# Patient Record
Sex: MALE | Race: WHITE | NOT HISPANIC OR LATINO | URBAN - METROPOLITAN AREA
[De-identification: names, ages, dates, MRNs, and addresses within clinical notes are randomized per-mention and may not be internally consistent; named-entity substitution may affect disease eponyms.]

---

## 2017-01-24 ENCOUNTER — TELEPHONE (OUTPATIENT)
Dept: NEUROLOGY | Facility: CLINIC | Age: 22
End: 2017-01-24

## 2017-01-24 NOTE — TELEPHONE ENCOUNTER
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

## 2017-01-26 NOTE — TELEPHONE ENCOUNTER
Received a VM from [facility] regarding patient's med. Please expedite this and see Griselda's note from 6/30/2014. Thanks.

## 2017-01-26 NOTE — TELEPHONE ENCOUNTER
Pharmacy calling. Patient will be out of medication as of tomorrow. Pharmacy would like a call back on what the status of this PA is.

## 2017-01-27 NOTE — TELEPHONE ENCOUNTER
[facility] Prior Authorization Team   Phone: [phone]  Fax: [phone]    PA Initiation    Medication: LAMICTAL 200 MG tablet  Insurance Company: [employer] - Phone [phone] Fax [phone]  [id number] [address]  Filling Pharmacy Phone: [phone]  Filling Pharmacy Fax: [phone]  Start Date: 1/27/2017    Attached Dr notes

## 2017-01-30 NOTE — TELEPHONE ENCOUNTER
Prior Authorization Approval    Authorization Effective Date: 1/28/2017  Authorization Expiration Date: 1/28/2018  Medication: LAMICTAL 200 MG tablet- approved  Approved Dose/Quantity:   Reference #: [id number]   Insurance Company: [facility] - Phone [phone] Fax [phone]  Expected CoPay: $0.00     CoPay Card Available:      Foundation Assistance Needed:    Which Pharmacy is filling the prescription (Not needed for infusion/clinic administered): [id number] [place] [facility] [address] [place] [address]  Pharmacy Notified: Yes  Patient Notified: Yes, left message

## 2017-03-15 DIAGNOSIS — G40.319 GENERALIZED CONVULSIVE EPILEPSY WITH INTRACTABLE EPILEPSY (H): ICD-10-CM

## 2017-03-15 RX ORDER — PERAMPANEL 4 MG/1
TABLET ORAL
Qty: 90 TABLET | Refills: 1 | Status: SHIPPED | OUTPATIENT
Start: 2017-03-15 | End: 2017-06-30

## 2017-03-15 NOTE — TELEPHONE ENCOUNTER
----- Message from Josseline Virgen CMA sent at 3/15/2017 12:39 PM CDT -----  Regarding: Refill  Drug Name: Perampanel (FYCOMPA)       Dose: 4 mg tablet    Brand  SIG: TAKE 1 TABLET BY MOUTH DAILY AT BEDTIME                                  Days Supply Needed: 90    Pharmacy: [place] [id number] [place] [facility] [place], [place] - [address] [facility]      Date of Last Appointment: 09/08/2016  Next RTC Date: 06/16/2017  Has a script already been sent recently: No    Additonal Notes: Needs by the weekend

## 2017-05-22 DIAGNOSIS — G40.319 GENERALIZED CONVULSIVE EPILEPSY WITH INTRACTABLE EPILEPSY (H): ICD-10-CM

## 2017-05-22 RX ORDER — LAMOTRIGINE 200 MG/1
TABLET ORAL
Qty: 360 TABLET | Refills: 2 | OUTPATIENT
Start: 2017-05-22

## 2017-06-30 ENCOUNTER — OFFICE VISIT (OUTPATIENT)
Dept: NEUROLOGY | Facility: CLINIC | Age: 22
End: 2017-06-30

## 2017-06-30 VITALS
SYSTOLIC BLOOD PRESSURE: 122 MMHG | HEART RATE: 81 BPM | BODY MASS INDEX: 27.05 KG/M2 | HEIGHT: 69 IN | DIASTOLIC BLOOD PRESSURE: 62 MMHG | WEIGHT: 182.6 LBS

## 2017-06-30 DIAGNOSIS — G40.319 GENERALIZED CONVULSIVE EPILEPSY WITH INTRACTABLE EPILEPSY (H): Primary | ICD-10-CM

## 2017-06-30 RX ORDER — LAMOTRIGINE 200 MG/1
TABLET ORAL
Qty: 360 TABLET | Refills: 3 | Status: SHIPPED | OUTPATIENT
Start: 2017-06-30 | End: 2018-07-16

## 2017-06-30 RX ORDER — ZONISAMIDE 100 MG/1
CAPSULE ORAL
Qty: 360 CAPSULE | Refills: 1 | Status: SHIPPED | OUTPATIENT
Start: 2017-06-30 | End: 2018-08-02

## 2017-06-30 NOTE — PROGRESS NOTES
"Interval HX: No daytime sz since July 2015. This is coincident with starting peramampanel. Mom  notes some \"jerking\" at night but it does not wake Zach    Prior to Admission medications    Medication Sig Start Date End Date Taking? Authorizing Provider   Perampanel (FYCOMPA) 4 MG tablet Take 1 tablet (4 mg) by mouth At Bedtime 9/8/16  Yes Panda Holden MD   zonisamide (ZONEGRAN) 100 MG capsule TAKE TWO CAPSULES BY MOUTH THREE TIMES DAILY 9/8/16  Yes Panda Holden MD   LAMICTAL 200 MG tablet 1 in Am 1 around noon  2 at night.YULY only. Patient taking differently, taking 200mg TID. 9/8/16  Yes Panda Holden MD       HISTORY REVIEWED 6/30/17: Zach   has symptomatic generalized brittle epilepsy.  Age of onset was 6 years.  He has allergies to Dilantin and Depakote and he has failed generic Lamictal.  Neuropsychological testing 06/30/2010 indicated full scale IQ of 66, but this may have been somewhat influenced by his lack of cooperation.  Nevertheless, the impression was that the test results revealed considerable evidence of generalized cerebral dysfunction, moderate in overall degree and reflected in widespread intellectual limitations and cognitive deficits and scattered diffuse features.He also has ADHD.      His last EEG was in 2/22/2016 and was interpreted as moderate disturbance of background activity with frequent electrographic interictal and ictal activity which was generalized. Not significantly changed from previous in 2012   He had a presurgical evaluation at [facility] in 2007 and also had a [facility] evaluation in 2004.  MRI scan at [facility] 04/2011 was essentially normal, with no evidence of mesial temporal sclerosis.    He has been tried on a number of medications but mother feels that the current medications are the best in terms of seizure control, though they are certainly not fully controlling seizures.     Another issue is financial.  Because he failed the generic Lamictal, their drug costs " "are quite high and he needs annual preauthorization.  This creates a great deal of headache and red tape for our clinic, [employer], and pharmacists.  Unfortunately the system is such that even though his situation is chronic and not changing, an annual review is done.    ROS: has small lesions on arm and back. Bone in left wrist \"no blood supply\" Otherwise negative for remainder of 14 point ros   EXAMINATION: Now tall, thin. He was more verbal and interacted well and cooperated with the examination than last time.  Speech was fluent.  Extraocular movements were full without nystagmus.  Pupils reacted briskly to light.  Facial movements were symmetrical.  Swallowing was normal.  Hearing was intact.  Visual fields were full to confrontation.  Finger-nose-finger was intact.  Tandem gait was normal. Muscle tone and strength and coordination in the upper and lower extremities were normal.      ASSESSMENT:  Daytime Sz now under control !!! Begin taper of ZNS as mom feels this has affected behavior to a small degree   Plan:  1) ZNS drop to 100 at noon for 2 weeks the D/C noon dose. Continue 200 AM and 200 HS   2) Continue perampanel at 4 mg hs   3) RTC 3mo   4)ASD levels         "

## 2017-06-30 NOTE — MR AVS SNAPSHOT
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 Continue 2 Am and 2 PM and all others the same. If any seizures increase Fycompa to 2 (8 mg) at night    Take 2 zonisamide at 6 am    Take 1 zonisamide at 4 pm for 2 weeks, then take none.     Take 2 zonisamide at 8 pm.           Follow-ups after your visit        Follow-up notes from your care team     Return in about 3 months (around 9/30/2017).      Your next 10 appointments already scheduled     Oct 03, 2017  4:00 PM CDT   Return Visit with MD SEAN [facility] ([facility])    [address], [address]  [facility] [address] [phone]              Who to contact     Please call your clinic at [phone] to:    Ask questions about your health    Make or cancel appointments    Discuss your medicines    Learn about your test results    Speak to your doctor   If you have compliments or concerns about an experience at your clinic, or if you wish to file a complaint, please contact [facility] Patient Relations at [phone] or email us at [email or web address]         Additional Information About Your Visit        MyChart Information     "[employer] is an electronic gateway that provides easy, online access to your medical records. With "[employer] you can request a clinic appointment, read your test results, renew a prescription or communicate with your care team.     To sign up for "[employer] visit the website at [email or web address]   You will be asked to enter the access code listed below, as  "well as some personal information. Please follow the directions to create your username and password.     Your access code is: [email or web address]  Expires: 2017  2:22 PM     Your access code will  in 90 days. If you need help or a new code, please contact your [facility] or call [phone] for assistance.        Care EveryWhere ID     This is your Care EveryWhere ID. This could be used by other organizations to access your [place] medical records  [id number]        Your Vitals Were     Pulse Height BMI (Body Mass Index)             81 5' 9.1\" (175.5 cm) 26.89 kg/m2          Blood Pressure from Last 3 Encounters:   17 122/62   16 120/70   16 121/62    Weight from Last 3 Encounters:   17 182 lb 9.6 oz (82.8 kg)   16 184 lb 12.8 oz (83.8 kg)   16 186 lb 9.6 oz (84.6 kg) (86 %)*     * Growth percentiles are based on [facility] 2-20 Years data.              We Performed the Following     Lamotrigine Level     Vitamin D Deficiency (Calcifediol)     Zonisamide Level Quantitative          Today's Medication Changes          These changes are accurate as of: 17  2:41 PM.  If you have any questions, ask your nurse or doctor.               These medicines have changed or have updated prescriptions.        Dose/Directions    perampanel 4 MG tablet   Commonly known as:  FYCOMPA   This may have changed:  See the new instructions.   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  [address] [place] [address]     Phone:  [phone]     LAMICTAL 200 MG tablet    zonisamide 100 MG capsule         Some of these will need a paper prescription and others can be bought over the counter.  Ask your nurse if you have questions.     Bring a paper prescription for each of these medications     perampanel 4 MG tablet                Primary Care Provider Office Phone # Fax #    Ade Alyse Flowers [phone]       [facility] [address]  [facility] [address]        Equal Access to Services     [facility] Angeles[facility]AND[facility] : Hadii nery calderón hadasho Soleelee, waaxda luqadaha, qaybta kaalmada adeegyada, nic guerra . So [facility] [phone].    ATENCIÓN: Si habla español, tiene a lopez disposición servicios gratuitos de asistencia lingüística. Llame al [phone].    We comply with applicable federal civil rights laws and [place] laws. We do not discriminate on the basis of race, color, national origin, age, disability sex, sexual orientation or gender identity.            Thank you!     Thank you for choosing [facility]  for your care. Our goal is always to provide you with excellent care. Hearing back from our patients is one way we can continue to improve our services. Please take a few minutes to complete the written survey that you may receive in the mail after your visit with us. Thank you!             Your Updated Medication List - Protect others around you: Learn how to safely use, store and throw away your medicines at www.disposemymeds.org.          This list is accurate as of: 6/30/17  2:41 PM.  Always use your most recent med list.                   Brand Name Dispense Instructions for use Diagnosis    LAMICTAL 200 MG tablet   Generic drug:  lamoTRIgine     360 tablet    Take 1 tablet po am 1 tablet around noon  2 at night. YULY only.     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

## 2017-06-30 NOTE — PATIENT INSTRUCTIONS
Take one Zonisamide at noon for 2 weeks then take none . Continue 2 Am and 2 PM and all others the same. If any seizures increase Fycompa to 2 (8 mg) at night    Take 2 zonisamide at 6 am    Take 1 zonisamide at 4 pm for 2 weeks, then take none.     Take 2 zonisamide at 8 pm.

## 2017-07-01 LAB — DEPRECATED CALCIDIOL+CALCIFEROL SERPL-MC: 27 UG/L (ref 20–75)

## 2017-07-02 LAB
LAMOTRIGINE SERPL-MCNC: 14.4 UG/ML
ZONISAMIDE SERPL-MCNC: 26 UG/ML

## 2017-08-25 ENCOUNTER — TELEPHONE (OUTPATIENT)
Dept: NEUROLOGY | Facility: CLINIC | Age: 22
End: 2017-08-25

## 2017-08-25 NOTE — TELEPHONE ENCOUNTER
[facility] Prior Authorization Team   Phone: [phone]  Fax: [phone]      PA Initiation    Medication: Zonegran 100 mg  Insurance Company: [employer] - Phone [phone] Fax [phone]  [id number] [place] - [address]  Filling Pharmacy Phone: [phone]  Filling Pharmacy Fax:    Start Date: 8/25/2017    MANUALLY FAXED IN [place] [phone]

## 2017-08-25 NOTE — TELEPHONE ENCOUNTER
Prior Authorization Retail Medication Request  Medication/Dose: Lamictal 200 mg  Diagnosis and ICD code:   Generalized convulsive epilepsy with intractable epilepsy (H)  - Primary G40.319      New/Renewal/Insurance Change PA: Insurance Change  Previously Tried and Failed Therapies: See 1/24/2017 PA     Insurance ID (if provided):   Insurance Phone (if provided):      Any additional info from fax request:      If you received a fax notification from an outside Pharmacy:  [facility]  Pharmacy #:[phone]  Pharmacy Fax:[phone]

## 2017-08-25 NOTE — TELEPHONE ENCOUNTER
According to mom patient was changed from Medicare choice to [facility]! On medica choice, patient 's LAMICTAL- LAMOTRIGINE YULY approved until 1/2018!! According to mom in May patient was changed and only now they started with an issue.!   co-payment for generic  zonisamide is 58.-/month and patient is going to be  tapered off.    Called [facility] and spoke with May and I told her as well as Dr. Holden, patient needs those medications it is otherwise life and death. Patient could go into status epilepticus and 20% death rate. Asked minimum for over the weekend approval!    Spent 1 hour to take care of this issue!    Pharmacy will give supply over the week-end    PA request will be put into the system by !!    We can help out with Fycompa 4 mg.

## 2017-08-25 NOTE — TELEPHONE ENCOUNTER
[facility] Prior Authorization Team   Phone: [phone]  Fax: [phone]      PA Initiation    Medication: Fycompa 4 mg  Insurance Company: [facility] - Phone [phone] Fax [phone]  [facility] [id number] [place] [facility] [place] [place] - [address]  Filling Pharmacy Phone: [phone]  Filling Pharmacy Fax:    Start Date: 8/25/2017    MANUALLY FAXED IN [place] [phone]

## 2017-08-25 NOTE — TELEPHONE ENCOUNTER
Prior Authorization Retail Medication Request  Medication/Dose: Zonegran 100 mg  Diagnosis and ICD code:   Generalized convulsive epilepsy with intractable epilepsy (H)  - Primary G40.319      New/Renewal/Insurance Change PA: Insurance Change  Previously Tried and Failed Therapies: See Chart     Insurance ID (if provided):   Insurance Phone (if provided):      Any additional info from fax request:      If you received a fax notification from an outside Pharmacy:  [facility]  Pharmacy #:[phone]  Pharmacy Fax:[phone]

## 2017-08-25 NOTE — TELEPHONE ENCOUNTER
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

## 2017-08-25 NOTE — TELEPHONE ENCOUNTER
[facility] Prior Authorization Team   Phone: [phone]  Fax: [phone]      PA Initiation    Medication: Lamictal 200 mg  Insurance Company: [employer] - Phone [phone] Fax [phone]  [id number] [place] [place] - [address]  Filling Pharmacy Phone: [phone]  Filling Pharmacy Fax:    Start Date: 8/25/2017    MANUALLY FAXED IN [place] [phone]

## 2017-08-30 NOTE — TELEPHONE ENCOUNTER
Prior Authorization was not needed      Medication: Lamictal 200 mg- PA WAS NOT NEEDED  Approved Dose/Quantity:   Reference #:     Insurance Company: [employer] - Phone [phone] Fax [phone]  Expected CoPay:     $3.00  CoPay Card Available:      Foundation Assistance Needed:    Which Pharmacy is filling the prescription (Not needed for infusion/clinic administered): [facility] [id number] [place] [address]  Pharmacy Notified: Yes  Patient Notified: Yes      PA WAS NOT NEEDED. [employer] CLAIM WAS BOUNCING OFF OLD INS (BCBS). I HAD CALLED MOM TO CALL INS AND HAVE THAT REMOVED. PHARMACY WAS ABLE TO GET A PAID CLAIM OF $3.00. PATIENT IS INFORMED OF THIS BUT HAS NOT YET PICKED UP MED.

## 2017-08-30 NOTE — TELEPHONE ENCOUNTER
Prior Authorization was not needed      Medication: Fycompa 4 mg- PA NOT NEEDED  Approved Dose/Quantity:   Reference #:     Insurance Company: [employer] - Phone [phone] Fax [phone]  Expected CoPay: $3.00     CoPay Card Available:      Foundation Assistance Needed:    Which Pharmacy is filling the prescription (Not needed for infusion/clinic administered): [facility] [id number] [place] [address]  Pharmacy Notified: Yes  Patient Notified: Yes      PA WAS NOT NEEDED. [employer] CLAIM WAS BOUNCING OFF OLD INS (BCBS). I HAD CALLED MOM TO CALL INS AND HAVE THAT REMOVED. PHARMACY WAS ABLE TO GET A PAID CLAIM OF $3.00. PATIENT IS INFORMED OF THIS BUT HAS NOT YET PICKED UP MED.

## 2017-08-30 NOTE — TELEPHONE ENCOUNTER
Prior Authorization  Was not needed    Medication: Zonegran 100 mg- PA WAS NOT NEEDED  Approved Dose/Quantity:   Reference #:     Insurance Company: [employer] - Phone [phone] Fax [phone]  Expected CoPay: $1.00     CoPay Card Available:      Foundation Assistance Needed:    Which Pharmacy is filling the prescription (Not needed for infusion/clinic administered): [facility] [id number] [place] [address]  Pharmacy Notified: Yes  Patient Notified: Yes    PA WAS NOT NEEDED. [employer] CLAIM WAS BOUNCING OFF OLD INS (BCBS). I HAD CALLED MOM TO CALL INS AND HAVE THAT REMOVED. PHARMACY WAS ABLE TO GET A PAID CLAIM OF $1.00. PATIENT IS INFORMED OF THIS BUT HAS NOT YET PICKED UP MED.

## 2017-10-03 ENCOUNTER — OFFICE VISIT (OUTPATIENT)
Dept: NEUROLOGY | Facility: CLINIC | Age: 22
End: 2017-10-03

## 2017-10-03 VITALS
DIASTOLIC BLOOD PRESSURE: 77 MMHG | BODY MASS INDEX: 24.28 KG/M2 | SYSTOLIC BLOOD PRESSURE: 134 MMHG | HEART RATE: 78 BPM | HEIGHT: 73 IN | WEIGHT: 183.2 LBS

## 2017-10-03 DIAGNOSIS — G40.319 GENERALIZED CONVULSIVE EPILEPSY WITH INTRACTABLE EPILEPSY (H): Primary | ICD-10-CM

## 2017-10-03 NOTE — MR AVS SNAPSHOT
After Visit Summary   10/3/2017    Zach Gurrola    MRN: [id number]           Patient Information     Date Of Birth          1995        Visit Information        Provider Department      10/3/2017 4:00 PM [facility]         Follow-ups after your visit        Your next 10 appointments already scheduled     Oct 04, 2017 12:30 PM CDT   New Patient Visit with Lorenzo Brink, PhD [facility] ([facility])    [address], [address]  [facility] [id number] [phone]            2018  2:30 PM CST   Return [facility]    [address], [address]  [facility] [address] [phone]              Who to contact     Please call your clinic at [phone] to:    Ask questions about your health    Make or cancel appointments    Discuss your medicines    Learn about your test results    Speak to your doctor   If you have compliments or concerns about an experience at your clinic, or if you wish to file a complaint, please contact [facility] Patient Relations at [phone] or email us at [email or web address]         Additional Information About Your Visit        MyChart Information     [employer] is an electronic gateway that provides easy, online access to your medical records. With [employer], you can request a clinic appointment, read your test results, renew a prescription or communicate with your care team.     To sign up for [employer] visit the website at [email or web address]   You will be asked to enter the access code listed below, as well as some personal information. Please follow the directions to create your username and password.     Your access code is: [email or web address]  Expires: 2018  4:26 PM     Your access code will  in 90 days. If you need help or a new code, please contact your [facility]  "[place] [facility] or call [phone] for assistance.        Care EveryWhere ID     This is your Care EveryWhere ID. This could be used by other organizations to access your [place] medical records  [id number]        Your Vitals Were     Pulse Height BMI (Body Mass Index)             78 6' 1\" (185.4 cm) 24.17 kg/m2          Blood Pressure from Last 3 Encounters:   10/03/17 134/77   06/30/17 122/62   09/08/16 120/70    Weight from Last 3 Encounters:   10/03/17 183 lb 3.2 oz (83.1 kg)   06/30/17 182 lb 9.6 oz (82.8 kg)   09/08/16 184 lb 12.8 oz (83.8 kg)              Today, you had the following     No orders found for display       Primary Care Provider Office Phone # Fax #    Ade Alyse Flowers [phone]       [facility] [address]  [facility] [address]        Equal Access to Services     [facility]ANDI : Hadii aad ku hadasho Soomaali, waaxda luqadaha, qaybta kaalmada adeegyada, waxay idiin hayshaynan mukesh guerra . So [facility] [phone].    ATENCIÓN: Si habla español, tiene a lopez disposición servicios gratuitos de asistencia lingüística. LlMer[facility] [phone].    We comply with applicable federal civil rights laws and [place] laws. We do not discriminate on the basis of race, color, national origin, age, disability, sex, sexual orientation, or gender identity.            Thank you!     Thank you for choosing [facility]  for your care. Our goal is always to provide you with excellent care. Hearing back from our patients is one way we can continue to improve our services. Please take a few minutes to complete the written survey that you may receive in the mail after your visit with us. Thank you!             Your Updated Medication List - Protect others around you: Learn how to safely use, store and throw away your medicines at www.disposemymeds.org.          This list is accurate as of: 10/3/17  4:26 PM.  Always use your most recent med list. "                   Brand Name Dispense Instructions for use Diagnosis    LAMICTAL 200 MG tablet   Generic drug:  lamoTRIgine     360 tablet    Take 1 tablet po am 1 tablet around noon  2 at night. YULY only.    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

## 2017-10-03 NOTE — LETTER
"10/3/2017       RE: Zach Gurrola  : 1995   MRN: [id number]      Dear Colleague,    Thank you for referring your patient, Zach Gurrola, to the [facility] EPILEPSY CARE at [facility]. Please see a copy of my visit note below.    Interval HX: Continues to be sz free. No daytime sz since 2015. This is coincident with starting peramampanel. Mom  notes some \"jerking\" at night but it does not wake Zach    Prior to Admission medications    Medication Sig Start Date End Date Taking? Authorizing Provider   Perampanel (FYCOMPA) 4 MG tablet Take 1 tablet (4 mg) by mouth At Bedtime 16  Yes Panda Holden MD   zonisamide (ZONEGRAN) 100 MG capsule TAKE TWO CAPSULES BY MOUTH THREE TIMES DAILY 16  Yes Panda Holden MD   LAMICTAL 200 MG tablet 1 in Am 1 around noon  2 at night.YULY only. Patient taking differently, taking 200mg TID. 16  Yes Panda Holden MD       HISTORY REVIEWED 9/3/17: Zach   has symptomatic generalized brittle epilepsy.  Age of onset was 6 years.  He has allergies to Dilantin and Depakote and he has failed generic Lamictal.  Neuropsychological testing 2010 indicated full scale IQ of 66, but this may have been somewhat influenced by his lack of cooperation.  Nevertheless, the impression was that the test results revealed considerable evidence of generalized cerebral dysfunction, moderate in overall degree and reflected in widespread intellectual limitations and cognitive deficits and scattered diffuse features.He also has ADHD.      His last EEG was in 2016 and was interpreted as moderate disturbance of background activity with frequent electrographic interictal and ictal activity which was generalized. Not significantly changed from previous in    He had a presurgical evaluation at [place] in  and also had a [facility] evaluation in .  MRI scan at [facility] 2011 was essentially normal, with no evidence of mesial temporal " "sclerosis.    He has been tried on a number of medications but mother feels that the current medications are the best in terms of seizure control, though they are certainly not fully controlling seizures.     Another issue is financial.  Because he failed the generic Lamictal, their drug costs are quite high and he needs annual preauthorization.  This creates a great deal of headache and red tape for our clinic, [employer], and pharmacists.  Unfortunately the system is such that even though his situation is chronic and not changing, an annual review is done.    ROS: has small lesions on arm and back. Bone in left wrist \"no blood supply\" Otherwise negative for remainder of 14 point ros   EXAMINATION: Now tall, thin. He was more verbal and interacted well and cooperated with the examination than last time.  Speech was fluent.  Extraocular movements were full without nystagmus.  Pupils reacted briskly to light.  Facial movements were symmetrical.  Swallowing was normal.  Hearing was intact.  Visual fields were full to confrontation.  Finger-nose-finger was intact.  Tandem gait was normal. Muscle tone and strength and coordination in the upper and lower extremities were normal.      ASSESSMENT:  Daytime Sz now under control !!! Continue taper of ZNS as mom feels this has affected behavior to a small degree. More than 50% of 26 min visit in discussing disability  For low intellectual functioning. He has not learned to drive as he is having difficulty learning., Also no work; \"relaxes\" at home all day. Did not do well in school. I believe he is not capable of living on own. Will do neuropsych testing.   Plan:  1) ZNS drop to 100 at AM 4 weeks  And 200 hs. Drop 100 mg ZNS every month.  2) Continue perampanel at 4 mg hs   3) RTC 3mo   4) Neuropsych testing      Panda Holden MD      "

## 2017-10-03 NOTE — PATIENT INSTRUCTIONS
Take 0ne 100 mg Zonisamide in AM, 2 im PM for 1 month, then take 1 Am and 1 PM for a month, then 1 in PM for a month, then stop. If seizures happen, go back to 2 twice a day and call us.

## 2017-10-03 NOTE — PROGRESS NOTES
"Interval HX: Continues to be sz free. No daytime sz since July 2015. This is coincident with starting peramampanel. Mom  notes some \"jerking\" at night but it does not wake Zach    Prior to Admission medications    Medication Sig Start Date End Date Taking? Authorizing Provider   Perampanel (FYCOMPA) 4 MG tablet Take 1 tablet (4 mg) by mouth At Bedtime 9/8/16  Yes Panda Holden MD   zonisamide (ZONEGRAN) 100 MG capsule TAKE TWO CAPSULES BY MOUTH THREE TIMES DAILY 9/8/16  Yes Panda Holden MD   LAMICTAL 200 MG tablet 1 in Am 1 around noon  2 at night.YULY only. Patient taking differently, taking 200mg TID. 9/8/16  Yes Panda Holden MD       HISTORY REVIEWED 9/3/17: Zach   has symptomatic generalized brittle epilepsy.  Age of onset was 6 years.  He has allergies to Dilantin and Depakote and he has failed generic Lamictal.  Neuropsychological testing 06/30/2010 indicated full scale IQ of 66, but this may have been somewhat influenced by his lack of cooperation.  Nevertheless, the impression was that the test results revealed considerable evidence of generalized cerebral dysfunction, moderate in overall degree and reflected in widespread intellectual limitations and cognitive deficits and scattered diffuse features.He also has ADHD.      His last EEG was in 2/22/2016 and was interpreted as moderate disturbance of background activity with frequent electrographic interictal and ictal activity which was generalized. Not significantly changed from previous in 2012   He had a presurgical evaluation at [place] in 2007 and also had a [facility] evaluation in 2004.  MRI scan at [facility] 04/2011 was essentially normal, with no evidence of mesial temporal sclerosis.    He has been tried on a number of medications but mother feels that the current medications are the best in terms of seizure control, though they are certainly not fully controlling seizures.     Another issue is financial.  Because he failed the generic " "Lamictal, their drug costs are quite high and he needs annual preauthorization.  This creates a great deal of headache and red tape for our clinic, [employer], and pharmacists.  Unfortunately the system is such that even though his situation is chronic and not changing, an annual review is done.    ROS: has small lesions on arm and back. Bone in left wrist \"no blood supply\" Otherwise negative for remainder of 14 point ros   EXAMINATION: Now tall, thin. He was more verbal and interacted well and cooperated with the examination than last time.  Speech was fluent.  Extraocular movements were full without nystagmus.  Pupils reacted briskly to light.  Facial movements were symmetrical.  Swallowing was normal.  Hearing was intact.  Visual fields were full to confrontation.  Finger-nose-finger was intact.  Tandem gait was normal. Muscle tone and strength and coordination in the upper and lower extremities were normal.      ASSESSMENT:  Daytime Sz now under control !!! Continue taper of ZNS as mom feels this has affected behavior to a small degree. More than 50% of 26 min visit in discussing disability  For low intellectual functioning. He has not learned to drive as he is having difficulty learning., Also no work; \"relaxes\" at home all day. Did not do well in school. I believe he is not capable of living on own. Will do neuropsych testing.   Plan:  1) ZNS drop to 100 at AM 4 weeks  And 200 hs. Drop 100 mg ZNS every month.  2) Continue perampanel at 4 mg hs   3) RTC 3mo   4) Neuropsych testing  "

## 2018-01-02 DIAGNOSIS — G40.319 GENERALIZED CONVULSIVE EPILEPSY WITH INTRACTABLE EPILEPSY (H): ICD-10-CM

## 2018-01-02 RX ORDER — PERAMPANEL 4 MG/1
TABLET ORAL
Qty: 90 TABLET | Status: CANCELLED | OUTPATIENT
Start: 2018-01-02

## 2018-01-05 ENCOUNTER — OFFICE VISIT (OUTPATIENT)
Dept: NEUROLOGY | Facility: CLINIC | Age: 23
End: 2018-01-05
Payer: COMMERCIAL

## 2018-01-05 VITALS
SYSTOLIC BLOOD PRESSURE: 124 MMHG | BODY MASS INDEX: 24.7 KG/M2 | DIASTOLIC BLOOD PRESSURE: 62 MMHG | HEART RATE: 79 BPM | HEIGHT: 73 IN | WEIGHT: 186.4 LBS

## 2018-01-05 DIAGNOSIS — G40.319 GENERALIZED CONVULSIVE EPILEPSY WITH INTRACTABLE EPILEPSY (H): ICD-10-CM

## 2018-01-05 RX ORDER — ZONISAMIDE 100 MG/1
CAPSULE ORAL
Qty: 360 CAPSULE | Refills: 1 | OUTPATIENT
Start: 2018-01-05

## 2018-01-05 NOTE — MR AVS SNAPSHOT
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      Who to contact     Please call your clinic at [phone] to:    Ask questions about your health    Make or cancel appointments    Discuss your medicines    Learn about your test results    Speak to your doctor   If you have compliments or concerns about an experience at your clinic, or if you wish to file a complaint, please contact [facility] Patient Relations at [phone] or email us at [email or web address]         Additional Information About Your Visit        MyChart Information     [employer] is an electronic gateway that provides easy, online access to your medical records. With [employer], you can request a clinic appointment, read your test results, renew a prescription or communicate with your care team.     To sign up for [employer] visit the website at [email or web address]   You will be asked to enter the access code listed below, as well as some personal information. Please follow the directions to create your username and password.     Your access code is: [email or web address]  Expires: 2018 12:50 PM     Your access code will  in 90 days. If you need help or a new code, please contact your [facility] Clinic or call [phone] for assistance.        Care EveryWhere ID     This is your Care EveryWhere ID. This could be used by other organizations to access your [facility] medical records  [id number]        Your Vitals Were     Pulse Height BMI (Body Mass Index)  "            79 6' 1\" (185.4 cm) 24.59 kg/m2          Blood Pressure from Last 3 Encounters:   01/05/18 124/62   10/03/17 134/77   06/30/17 122/62    Weight from Last 3 Encounters:   01/05/18 186 lb 6.4 oz (84.6 kg)   10/03/17 183 lb 3.2 oz (83.1 kg)   06/30/17 182 lb 9.6 oz (82.8 kg)              Today, you had the following     No orders found for display         Where to get your medicines      Some of these will need a paper prescription and others can be bought over the counter.  Ask your nurse if you have questions.     Bring a paper prescription for each of these medications     perampanel 4 MG tablet          Primary Care Provider Office Phone # Fax #    Ade Alyse Flowers [phone]       [place] [address]  [facility] [address]        Equal Access to Services     YURI CLARK AH: Hadii nery ku hadasho Soomaali, waaxda luqadaha, qaybta kaalmada adeegyada, nic guerra . So [facility] [phone].    ATENCIÓN: Si rhinala espletty, tiene a lopez disposición servicios gratuitos de asistencia lingüística. Llame al [phone].    We comply with applicable federal civil rights laws and [place] laws. We do not discriminate on the basis of race, color, national origin, age, disability, sex, sexual orientation, or gender identity.            Thank you!     Thank you for choosing [facility] EPILEPSY [facility]  for your care. Our goal is always to provide you with excellent care. Hearing back from our patients is one way we can continue to improve our services. Please take a few minutes to complete the written survey that you may receive in the mail after your visit with us. Thank you!             Your Updated Medication List - Protect others around you: Learn how to safely use, store and throw away your medicines at www.disposemymeds.org.          This list is accurate as of: 1/5/18 11:59 PM.  Always use your most recent med list.                   Brand Name " Dispense Instructions for use Diagnosis    LAMICTAL 200 MG tablet   Generic drug:  lamoTRIgine     360 tablet    Take 1 tablet po am 1 tablet around noon  2 at night. YULY only.    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

## 2018-01-05 NOTE — PROGRESS NOTES
"Interval HX: Continues to be sz free. No daytime sz since July 2015. This is coincident with starting peramampanel. Dad  notes some \"jerking\" at night but it does not wake Zach. In process of tapering zonisamide.    Prior to Admission medications    Medication Sig Start Date End Date Taking? Authorizing Provider   Perampanel (FYCOMPA) 4 MG tablet Take 1 tablet (4 mg) by mouth At Bedtime 9/8/16  Yes Panda Holden MD   zonisamide (ZONEGRAN) 100 MG capsule TAKE TWO CAPSULES BY MOUTH THREE TIMES DAILY 9/8/16  Yes Panda Holden MD   LAMICTAL 200 MG tablet 1 in Am 1 around noon  2 at night.YULY only. Patient taking differently, taking 200mg TID. 9/8/16  Yes Panda Holden MD       HISTORY REVIEWED 1/5/18: Zach   has symptomatic generalized brittle epilepsy with age of onset of 6 years.  He has allergies to Dilantin and Depakote and he has failed generic Lamictal.  Neuropsychological testing 06/30/2010 indicated full scale IQ of 66, but this may have been somewhat influenced by his lack of cooperation.  Nevertheless, the impression was that the test results revealed considerable evidence of generalized cerebral dysfunction, moderate in overall degree and reflected in widespread intellectual limitations and cognitive deficits and scattered diffuse features.He also has ADHD.      His last EEG was in 2/22/2016 and was interpreted as moderate disturbance of background activity with frequent electrographic interictal and ictal activity which was generalized. Not significantly changed from previous in 2012   He had a presurgical evaluation at [place] in 2007 and also had a [facility] evaluation in 2004.  MRI scan at [facility] 04/2011 was essentially normal, with no evidence of mesial temporal sclerosis.    ROS: has small lesions on arm and back. Bone in left wrist \"no blood supply\" Otherwise negative for remainder of 14 point ros   EXAMINATION: Now tall, thin. He was more verbal and interacted well and cooperated with the " "examination than last time.  Speech was fluent.  Extraocular movements were full without nystagmus.  Pupils reacted briskly to light.  Facial movements were symmetrical.  Swallowing was normal.  Hearing was intact.  Visual fields were full to confrontation.  Finger-nose-finger was intact.  Tandem gait was normal. Muscle tone and strength and coordination in the upper and lower extremities were normal.      ASSESSMENT:  Daytime Sz now under control !!! Continue taper of ZNS as mom feels this has affected behavior to a small degree.   He has not learned to drive as he is having difficulty learning., Also no work; \"relaxes\" at home all day. Did not do well in school. I believe he is not capable of living on own. Will do neuropsych testing.   Plan:  1) ZNS drop to 100 at AM 4 weeks   Drop 100 mg ZNS every month.  2) Continue perampanel at 4 mg hs   3) RTC1 year      "

## 2018-01-05 NOTE — LETTER
"2018       RE: Zach Gurrola  : 1995   MRN: [id number]      Dear Colleague,    Thank you for referring your patient, Zach Gurrola, to the [facility] EPILEPSY CARE at [facility]. Please see a copy of my visit note below.    Interval HX: Continues to be sz free. No daytime sz since 2015. This is coincident with starting peramampanel. Dad  notes some \"jerking\" at night but it does not wake Zach. In process of tapering zonisamide.    Prior to Admission medications    Medication Sig Start Date End Date Taking? Authorizing Provider   Perampanel (FYCOMPA) 4 MG tablet Take 1 tablet (4 mg) by mouth At Bedtime 16  Yes Panda Holden MD   zonisamide (ZONEGRAN) 100 MG capsule TAKE TWO CAPSULES BY MOUTH THREE TIMES DAILY 16  Yes Panda Holden MD   LAMICTAL 200 MG tablet 1 in Am 1 around noon  2 at night.YULY only. Patient taking differently, taking 200mg TID. 16  Yes Panda Holden MD       HISTORY REVIEWED 18: Zach   has symptomatic generalized brittle epilepsy with age of onset of 6 years.  He has allergies to Dilantin and Depakote and he has failed generic Lamictal.  Neuropsychological testing 2010 indicated full scale IQ of 66, but this may have been somewhat influenced by his lack of cooperation.  Nevertheless, the impression was that the test results revealed considerable evidence of generalized cerebral dysfunction, moderate in overall degree and reflected in widespread intellectual limitations and cognitive deficits and scattered diffuse features.He also has ADHD.      His last EEG was in 2016 and was interpreted as moderate disturbance of background activity with frequent electrographic interictal and ictal activity which was generalized. Not significantly changed from previous in    He had a presurgical evaluation at [place] in  and also had a [facility] evaluation in .  MRI scan at [facility] 2011 was essentially " "normal, with no evidence of mesial temporal sclerosis.    ROS: has small lesions on arm and back. Bone in left wrist \"no blood supply\" Otherwise negative for remainder of 14 point ros   EXAMINATION: Now tall, thin. He was more verbal and interacted well and cooperated with the examination than last time.  Speech was fluent.  Extraocular movements were full without nystagmus.  Pupils reacted briskly to light.  Facial movements were symmetrical.  Swallowing was normal.  Hearing was intact.  Visual fields were full to confrontation.  Finger-nose-finger was intact.  Tandem gait was normal. Muscle tone and strength and coordination in the upper and lower extremities were normal.      ASSESSMENT:  Daytime Sz now under control !!! Continue taper of ZNS as mom feels this has affected behavior to a small degree.   He has not learned to drive as he is having difficulty learning., Also no work; \"relaxes\" at home all day. Did not do well in school. I believe he is not capable of living on own. Will do neuropsych testing.   Plan:  1) ZNS drop to 100 at AM 4 weeks   Drop 100 mg ZNS every month.  2) Continue perampanel at 4 mg hs   3) RTC1 year          Again, thank you for allowing me to participate in the care of your patient.      Sincerely,    Panda Holden MD      "

## 2018-02-05 ENCOUNTER — TELEPHONE (OUTPATIENT)
Dept: NEUROLOGY | Facility: CLINIC | Age: 23
End: 2018-02-05

## 2018-02-05 NOTE — TELEPHONE ENCOUNTER
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 [address]  Pharmacy #:[phone]  Pharmacy Fax:[phone]

## 2018-02-05 NOTE — TELEPHONE ENCOUNTER
Central Prior Authorization Team   Phone: [phone]      PA Initiation    Medication: Lamictal 200 MG tablet-PA initiated  Insurance Company: [employer] - Phone [phone] Fax [phone]  Pharmacy Filling the Rx: [id number] [place] [facility] [place] [facility] [address]  Filling Pharmacy Phone: [phone]  Filling Pharmacy Fax: [phone]  Start Date: 2/5/2018

## 2018-02-07 ENCOUNTER — TRANSFERRED RECORDS (OUTPATIENT)
Dept: HEALTH INFORMATION MANAGEMENT | Facility: CLINIC | Age: 23
End: 2018-02-07

## 2018-02-08 NOTE — TELEPHONE ENCOUNTER
Central Prior Authorization Team   Phone: [phone]    Pa Approved  Dates: 2/6/2018 - 2/8/2019  Insurance will be faxing and sending letter to Dr. Reed.

## 2018-02-09 NOTE — TELEPHONE ENCOUNTER
Prior Authorization Approval    Authorization Effective Date: 2/6/2018  Authorization Expiration Date: 2/8/2019  Medication: Lamictal 200 MG tablet-APPROVED  Approved Dose/Quantity:  Reference #: [id number]   Insurance Company: [employer] - Phone [phone] Fax [phone]  Expected CoPay: Refill too soon   Until 2/14/18  CoPay Card Available:      Foundation Assistance Needed:    Which Pharmacy is filling the prescription (Not needed for infusion/clinic administered): [facility] [id number] [place] [facility] [place] [facility] [address] [facility]  Pharmacy Notified: Yes  Patient Notified: Yes

## 2018-02-15 ENCOUNTER — TELEPHONE (OUTPATIENT)
Dept: NEUROLOGY | Facility: CLINIC | Age: 23
End: 2018-02-15

## 2018-02-15 NOTE — TELEPHONE ENCOUNTER
DMV form received, via in person on 02/15/2018. Form placed in folder to be completed by an RN.  Mother would like to  tomorrow 02/16/2018    Janice Woodard CMA

## 2018-02-16 NOTE — TELEPHONE ENCOUNTER
DMV form signed, faxed to [employer] on 2/16/18, sent to scanning, and copy mailed to patient.     Eliana Gongora CMA

## 2018-07-14 DIAGNOSIS — G40.319 GENERALIZED CONVULSIVE EPILEPSY WITH INTRACTABLE EPILEPSY (H): ICD-10-CM

## 2018-07-14 RX ORDER — LAMOTRIGINE 200 MG/1
TABLET ORAL
Qty: 360 TABLET | Refills: 2 | Status: CANCELLED | OUTPATIENT
Start: 2018-07-14

## 2018-07-16 ENCOUNTER — TELEPHONE (OUTPATIENT)
Dept: NEUROLOGY | Facility: CLINIC | Age: 23
End: 2018-07-16

## 2018-07-16 DIAGNOSIS — G40.319 GENERALIZED CONVULSIVE EPILEPSY WITH INTRACTABLE EPILEPSY (H): ICD-10-CM

## 2018-07-16 RX ORDER — LAMOTRIGINE 200 MG/1
TABLET ORAL
Qty: 360 TABLET | Refills: 1 | Status: SHIPPED | OUTPATIENT
Start: 2018-07-16 | End: 2018-12-26

## 2018-07-17 DIAGNOSIS — G40.319 GENERALIZED CONVULSIVE EPILEPSY WITH INTRACTABLE EPILEPSY (H): ICD-10-CM

## 2018-07-18 RX ORDER — PERAMPANEL 4 MG/1
TABLET ORAL
Qty: 90 TABLET | Refills: 1 | Status: SHIPPED | OUTPATIENT
Start: 2018-07-18 | End: 2018-12-28

## 2018-08-02 ENCOUNTER — TELEPHONE (OUTPATIENT)
Dept: NEUROLOGY | Facility: CLINIC | Age: 23
End: 2018-08-02

## 2018-08-02 DIAGNOSIS — G40.319 GENERALIZED CONVULSIVE EPILEPSY WITH INTRACTABLE EPILEPSY (H): ICD-10-CM

## 2018-08-02 RX ORDER — ZONISAMIDE 100 MG/1
CAPSULE ORAL
Qty: 360 CAPSULE | Refills: 0 | Status: SHIPPED | OUTPATIENT
Start: 2018-08-02 | End: 2018-12-28 | Stop reason: ALTCHOICE

## 2018-10-05 NOTE — TELEPHONE ENCOUNTER
----- Message from Juany Tsai sent at 7/16/2018  3:06 PM CDT -----  Regarding: fills  RE: LAMICTAL 200 MG tablet    SIG: Take 1 tablet po am 1 tablet around noon  2 at night. YULY only.    # of days supply: 90    Pharmacy: [facility] [id number] [place] [facility] [place] [address] [facility] date: None    Request from: Family calling Nirali (mother). Pt's mother would like a call back when it's finished: [phone]     H/O abdominal aortic aneurysm repair

## 2018-12-26 DIAGNOSIS — G40.319 GENERALIZED CONVULSIVE EPILEPSY WITH INTRACTABLE EPILEPSY (H): ICD-10-CM

## 2018-12-28 RX ORDER — LAMOTRIGINE 200 MG/1
TABLET ORAL
Qty: 360 TABLET | Refills: 0 | Status: SHIPPED | OUTPATIENT
Start: 2018-12-28 | End: 2019-01-22

## 2018-12-28 NOTE — TELEPHONE ENCOUNTER
Discrepancy noted in charting regarding Lamictal dose. This nurse contacted the patient's mother.  She confirms dosing as:   Lamictal brand 200-200-400  Fycompa 4mg HS  Completely off zonisamide.     Med rec done.

## 2018-12-31 ENCOUNTER — TRANSFERRED RECORDS (OUTPATIENT)
Dept: HEALTH INFORMATION MANAGEMENT | Facility: CLINIC | Age: 23
End: 2018-12-31

## 2019-01-22 ENCOUNTER — OFFICE VISIT (OUTPATIENT)
Dept: NEUROLOGY | Facility: CLINIC | Age: 24
End: 2019-01-22
Payer: COMMERCIAL

## 2019-01-22 ENCOUNTER — TELEPHONE (OUTPATIENT)
Dept: NEUROLOGY | Facility: CLINIC | Age: 24
End: 2019-01-22

## 2019-01-22 VITALS
WEIGHT: 188 LBS | SYSTOLIC BLOOD PRESSURE: 109 MMHG | BODY MASS INDEX: 24.8 KG/M2 | HEART RATE: 80 BPM | TEMPERATURE: 97.5 F | DIASTOLIC BLOOD PRESSURE: 70 MMHG

## 2019-01-22 DIAGNOSIS — G40.319 GENERALIZED CONVULSIVE EPILEPSY WITH INTRACTABLE EPILEPSY (H): ICD-10-CM

## 2019-01-22 RX ORDER — LAMOTRIGINE 200 MG/1
TABLET ORAL
Qty: 360 TABLET | Refills: 3 | Status: SHIPPED | OUTPATIENT
Start: 2019-01-22 | End: 2020-01-31

## 2019-01-22 ASSESSMENT — PAIN SCALES - GENERAL: PAINLEVEL: NO PAIN (0)

## 2019-01-22 NOTE — TELEPHONE ENCOUNTER
Prior Authorization Retail Medication Request    Medication/Dose: LAMICTAL 200 MG tablet  ICD code (if different than what is on RX):    Previously Tried and Failed:    Rationale:      Insurance Name:    Insurance ID:        Pharmacy Information (if different than what is on RX)  Name:    Phone:

## 2019-01-22 NOTE — LETTER
"2019     RE: Zach Gurrola  : 1995   MRN: [id number]      Dear Colleague,    Thank you for referring your patient, Zach Gurrola, to the [facility] EPILEPSY CARE at [facility]. Please see a copy of my visit note below.    Interval HX: Continues to be sz free. No daytime sz since 2015. This is coincident with starting peramampanel. Dad  notes some \"jerking\" at night but it does not wake Zach.zonisamide has been stopped.    Prior to Admission medications    Medication Sig Start Date End Date Taking? 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  He has allergies to Dilantin and Depakote and he has failed generic Lamictal.  Neuropsychological testing 2010 indicated full scale IQ of 66, but this may have been somewhat influenced by his lack of cooperation.  Nevertheless, the impression was that the test results revealed considerable evidence of generalized cerebral dysfunction, moderate in overall degree and reflected in widespread intellectual limitations and cognitive deficits and scattered diffuse features.He also has ADHD.      His last EEG was in 2016 and was interpreted as moderate disturbance of background activity with frequent electrographic interictal and ictal activity which was generalized. Not significantly changed from previous in    He had a presurgical evaluation at [place] in  and also had a [facility] evaluation in .  MRI scan at [facility] 2011 was essentially normal, with no evidence of mesial temporal sclerosis.    ROS: has small lesions on arm and back. Bone in left wrist \"no blood " "supply\" Otherwise negative for remainder of 14 point ros   EXAMINATION: Now tall, thin. He was more verbal and interacted well and cooperated with the examination than last time.  Speech was fluent.  Extraocular movements were full without nystagmus.  Pupils reacted briskly to light.  Facial movements were symmetrical.  Swallowing was normal.  Hearing was intact.  Visual fields were full to confrontation.  Finger-nose-finger was intact.  Tandem gait was normal. Muscle tone and strength and coordination in the upper and lower extremities were normal.      ASSESSMENT:  Daytime Sz now under control !!!   taper of ZNS copmpleted.     He has not learned to drive as he is having difficulty learning., Also no work; \"relaxes\" at home all day. Did not do well in school. I believe he is not capable of living on own. Will do neuropsych testing.   Plan:  1) Continue LAMO 200  1-1-2.  2) Continue perampanel at 4 mg hs   3) RTC1 year      Again, thank you for allowing me to participate in the care of your patient.      Sincerely,    Panda Holden MD      "

## 2019-01-22 NOTE — PROGRESS NOTES
"Interval HX: Continues to be sz free. No daytime sz since July 2015. This is coincident with starting peramampanel. Dad  notes some \"jerking\" at night but it does not wake Zach.zonisamide has been stopped.    Prior to Admission medications    Medication Sig Start Date End Date Taking? 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  He has allergies to Dilantin and Depakote and he has failed generic Lamictal.  Neuropsychological testing 06/30/2010 indicated full scale IQ of 66, but this may have been somewhat influenced by his lack of cooperation.  Nevertheless, the impression was that the test results revealed considerable evidence of generalized cerebral dysfunction, moderate in overall degree and reflected in widespread intellectual limitations and cognitive deficits and scattered diffuse features.He also has ADHD.      His last EEG was in 2/22/2016 and was interpreted as moderate disturbance of background activity with frequent electrographic interictal and ictal activity which was generalized. Not significantly changed from previous in 2012   He had a presurgical evaluation at [place] in 2007 and also had a [facility] evaluation in 2004.  MRI scan at [facility] 04/2011 was essentially normal, with no evidence of mesial temporal sclerosis.    ROS: has small lesions on arm and back. Bone in left wrist \"no blood supply\" Otherwise negative for remainder of 14 point ros   EXAMINATION: Now tall, thin. He was more verbal and interacted well and cooperated with the examination than last time.  Speech was fluent.  Extraocular movements were full without nystagmus.  Pupils reacted briskly " "to light.  Facial movements were symmetrical.  Swallowing was normal.  Hearing was intact.  Visual fields were full to confrontation.  Finger-nose-finger was intact.  Tandem gait was normal. Muscle tone and strength and coordination in the upper and lower extremities were normal.      ASSESSMENT:  Daytime Sz now under control !!!   taper of ZNS copmpleted.     He has not learned to drive as he is having difficulty learning., Also no work; \"relaxes\" at home all day. Did not do well in school. I believe he is not capable of living on own. Will do neuropsych testing.   Plan:  1) Continue LAMO 200  1-1-2.  2) Continue perampanel at 4 mg hs   3) RTC1 year      "

## 2019-01-24 NOTE — TELEPHONE ENCOUNTER
Central Prior Authorization Team   [phone]    PA Initiation    Medication: LAMICTAL 200 MG tablet  Insurance Company: [employer] - Phone [phone] Fax [phone]  [id number] [place], [place] - [address]  Filling Pharmacy Phone: [phone]  Filling Pharmacy Fax:    Start Date: 1/24/2019

## 2019-01-25 NOTE — TELEPHONE ENCOUNTER
Prior Authorization Approval    *NOTE: Pharmacy stated that patient picked up medication on 1/22/19 quantity 120 tablets for 30 day supply*     Authorization Effective Date: 12/25/2018  Authorization Expiration Date: 1/25/2020  Medication: LAMICTAL 200 MG tablet-PA APPROVED   Approved Dose/Quantity:   Reference #: CASE ID #[id number]   Insurance Company: [employer] - Phone [phone] Fax [phone]  Expected CoPay:       CoPay Card Available:      Foundation Assistance Needed:    Which Pharmacy is filling the prescription (Not needed for infusion/clinic administered): [id number] [place] [facility] [address] [facility]  Pharmacy Notified: Yes  Patient Notified: Yes

## 2019-04-09 ENCOUNTER — TELEPHONE (OUTPATIENT)
Dept: NEUROLOGY | Facility: CLINIC | Age: 24
End: 2019-04-09

## 2019-04-29 DIAGNOSIS — G40.319 GENERALIZED CONVULSIVE EPILEPSY WITH INTRACTABLE EPILEPSY (H): ICD-10-CM

## 2019-04-30 RX ORDER — LAMOTRIGINE 200 MG/1
TABLET ORAL
Qty: 360 TABLET | Refills: 3 | OUTPATIENT
Start: 2019-04-30

## 2019-07-11 ENCOUNTER — TELEPHONE (OUTPATIENT)
Dept: PSYCHIATRY | Facility: CLINIC | Age: 24
End: 2019-07-11

## 2019-07-11 NOTE — TELEPHONE ENCOUNTER
Prior Authorization Retail Medication Request    Medication/Dose: Fycompa  ICD code (if different than what is on RX):    Previously Tried and Failed:  See chart  Rationale:  Mom would like this urgently    Insurance Name:    Insurance ID:        Pharmacy Information (if different than what is on RX)  [facility]  [id number] [place] [facility], [place] - [address]    Phone:

## 2019-07-15 NOTE — TELEPHONE ENCOUNTER
Patient's mother calls the clinic today to communicate the urgency of getting this PA processed today. Patient runs out of medication tonight. Call placed to PA Team; they are aware of this encounter and expect to have it completed today.

## 2019-07-16 NOTE — TELEPHONE ENCOUNTER
Voice mail left for patient's mother notifying her that we are still working on PA and expect it to be resolved today. Call placed to PA team, they have noted the urgency of this medication.

## 2019-07-16 NOTE — TELEPHONE ENCOUNTER
Prior Authorization Approval    Authorization Effective Date: 7/16/2019  Authorization Expiration Date: 7/15/2020  Medication: Fycompa - approved  Approved Dose/Quantity:   Reference #:     Insurance Company: [employer] - Phone [phone] Fax [phone]  Expected CoPay:       CoPay Card Available:      Foundation Assistance Needed:    Which Pharmacy is filling the prescription (Not needed for infusion/clinic administered): [id number] [place] [facility], [place] - [address] [facility] [address]  Pharmacy Notified: Yes  Patient Notified: Yes    Approved over the phone

## 2019-11-04 DIAGNOSIS — G40.319 GENERALIZED CONVULSIVE EPILEPSY WITH INTRACTABLE EPILEPSY (H): ICD-10-CM

## 2019-11-05 RX ORDER — PERAMPANEL 4 MG/1
TABLET ORAL
Qty: 30 TABLET | Refills: 1 | Status: SHIPPED | OUTPATIENT
Start: 2019-11-05 | End: 2019-12-27

## 2019-11-06 ENCOUNTER — TELEPHONE (OUTPATIENT)
Dept: NEUROLOGY | Facility: CLINIC | Age: 24
End: 2019-11-06

## 2019-11-06 NOTE — TELEPHONE ENCOUNTER
Central Prior Authorization Team   [phone]    PA Initiation    Medication: FYCOMPA 4 MG tablet  Insurance Company: [employer] - Phone [phone] Fax [phone]  [id number] [place], [place] - [address]  Filling Pharmacy Phone: [phone]  Filling Pharmacy Fax: [phone]  Start Date: 11/6/2019

## 2019-11-06 NOTE — TELEPHONE ENCOUNTER
Reyna, pt's mom, is calling to state that Karolina is needing additional information from us.  She asks that this be addressed asap.  Thanks!

## 2019-11-06 NOTE — TELEPHONE ENCOUNTER
Prior Authorization Retail Medication Request    Medication/Dose: FYCOMPA 4 MG tablet  ICD code (if different than what is on RX):    Previously Tried and Failed:    Rationale:      Insurance Name:    Insurance ID:        Pharmacy Information (if different than what is on RX)  Name:    Phone:

## 2019-11-07 NOTE — TELEPHONE ENCOUNTER
Prior Authorization Approval    Authorization Effective Date: 10/7/2019  Authorization Expiration Date: 11/5/2020  Medication: FYCOMPA 4 MG tablet-PA APPROVED   Approved Dose/Quantity:   Reference #: CASE ID # [id number]   Insurance Company: [employer] - Phone [phone] Fax [phone]  Expected CoPay:       CoPay Card Available:      Foundation Assistance Needed:    Which Pharmacy is filling the prescription (Not needed for infusion/clinic administered): [id number] [place] [address]  Pharmacy Notified: Yes- **Instructed pharmacy to notify patient when script is ready to /ship.**   Patient Notified: Yes

## 2019-12-27 ENCOUNTER — OFFICE VISIT (OUTPATIENT)
Dept: NEUROLOGY | Facility: CLINIC | Age: 24
End: 2019-12-27
Payer: COMMERCIAL

## 2019-12-27 VITALS
HEART RATE: 64 BPM | WEIGHT: 180 LBS | HEIGHT: 73 IN | BODY MASS INDEX: 23.86 KG/M2 | DIASTOLIC BLOOD PRESSURE: 69 MMHG | SYSTOLIC BLOOD PRESSURE: 124 MMHG

## 2019-12-27 DIAGNOSIS — G40.319 GENERALIZED CONVULSIVE EPILEPSY WITH INTRACTABLE EPILEPSY (H): Primary | ICD-10-CM

## 2019-12-27 DIAGNOSIS — G40.319 GENERALIZED CONVULSIVE EPILEPSY WITH INTRACTABLE EPILEPSY (H): ICD-10-CM

## 2019-12-27 ASSESSMENT — MIFFLIN-ST. JEOR: SCORE: 1865.35

## 2019-12-27 ASSESSMENT — PAIN SCALES - GENERAL: PAINLEVEL: NO PAIN (0)

## 2019-12-27 NOTE — LETTER
"2019     RE: Zach Gurrola  : 1995   MRN: [id number]      Dear Colleague,    Thank you for referring your patient, Zach Gurrola, to the [facility] EPILEPSY CARE at [facility]. Please see a copy of my visit note below.    Interval HX: Continues to be sz free. No daytime sz since 2015. Dad 2 episodes of severe dizziness in  evening.obably took extra lamo.    Prior to Admission medications    Medication Sig Start Date End Date Taking? 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  He has allergies to Dilantin and Depakote and he has failed generic Lamictal.  Neuropsychological testing 2010 indicated full scale IQ of 66, but this may have been somewhat influenced by his lack of cooperation.  Nevertheless, the impression was that the test results revealed considerable evidence of generalized cerebral dysfunction, moderate in overall degree and reflected in widespread intellectual limitations and cognitive deficits and scattered diffuse features.He also has ADHD.      His last EEG was in 2016 and was interpreted as moderate disturbance of background activity with frequent electrographic interictal and ictal activity which was generalized. Not significantly changed from previous in    He had a presurgical evaluation at [place] in  and also had a [facility] evaluation in .  MRI scan at [facility] 2011 was essentially normal, with no evidence of mesial temporal sclerosis.    ROS: has small lesions on arm and back. Bone in left wrist \"no blood supply\" Otherwise negative for remainder of 14 point ros "   EXAMINATION: Now tall, thin. He was more verbal and interacted well and cooperated with the examination than last time.  Speech was fluent.  Extraocular movements were full without nystagmus.  Pupils reacted briskly to light.  Facial movements were symmetrical.  Swallowing was normal.  Hearing was intact.  Visual fields were full to confrontation.  Finger-nose-finger was intact.  Tandem gait was normal. Muscle tone and strength and coordination in the upper and lower extremities were normal.      ASSESSMENT:  Daytime Sz now under control !!!   taper of ZNS copmpleted.   Now [id number]. Living at home but life is good.On high dose of LAMO so overdose would be accomplished by extra 200  Plan:  1) Continue LAMO 200  1-1-2.= 800  2) Continue perampanel at 4 mg hs   3) RTC1 year   4) LAMO level      Again, thank you for allowing me to participate in the care of your patient.      Sincerely,    Panda Holden MD

## 2019-12-27 NOTE — PROGRESS NOTES
"Interval HX: Continues to be sz free. No daytime sz since July 2015. Dad 2 episodes of severe dizziness in  evening.obably took extra lamo.    Prior to Admission medications    Medication Sig Start Date End Date Taking? 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  He has allergies to Dilantin and Depakote and he has failed generic Lamictal.  Neuropsychological testing 06/30/2010 indicated full scale IQ of 66, but this may have been somewhat influenced by his lack of cooperation.  Nevertheless, the impression was that the test results revealed considerable evidence of generalized cerebral dysfunction, moderate in overall degree and reflected in widespread intellectual limitations and cognitive deficits and scattered diffuse features.He also has ADHD.      His last EEG was in 2/22/2016 and was interpreted as moderate disturbance of background activity with frequent electrographic interictal and ictal activity which was generalized. Not significantly changed from previous in 2012   He had a presurgical evaluation at [place] in 2007 and also had a [facility] evaluation in 2004.  MRI scan at [facility] 04/2011 was essentially normal, with no evidence of mesial temporal sclerosis.    ROS: has small lesions on arm and back. Bone in left wrist \"no blood supply\" Otherwise negative for remainder of 14 point ros   EXAMINATION: Now tall, thin. He was more verbal and interacted well and cooperated with the examination than last time.  Speech was fluent.  Extraocular movements were full without nystagmus.  Pupils reacted briskly to light.  Facial movements were symmetrical.  Swallowing was " normal.  Hearing was intact.  Visual fields were full to confrontation.  Finger-nose-finger was intact.  Tandem gait was normal. Muscle tone and strength and coordination in the upper and lower extremities were normal.      ASSESSMENT:  Daytime Sz now under control !!!   taper of ZNS copmpleted.   Now [id number]. Living at home but life is good.On high dose of LAMO so overdose would be accomplished by extra 200  Plan:  1) Continue LAMO 200  1-1-2.= 800  2) Continue perampanel at 4 mg hs   3) RTC1 year   4) LAMO level

## 2019-12-30 RX ORDER — PERAMPANEL 4 MG/1
TABLET ORAL
Qty: 30 TABLET | Refills: 1 | OUTPATIENT
Start: 2019-12-30

## 2019-12-30 NOTE — TELEPHONE ENCOUNTER
Fycompa refill request received.  This request denied, prescription was renewed by  at the appointment 12/27/2019

## 2020-01-30 DIAGNOSIS — G40.319 GENERALIZED CONVULSIVE EPILEPSY WITH INTRACTABLE EPILEPSY (H): ICD-10-CM

## 2020-01-31 ENCOUNTER — TELEPHONE (OUTPATIENT)
Dept: NEUROLOGY | Facility: CLINIC | Age: 25
End: 2020-01-31

## 2020-01-31 RX ORDER — LAMOTRIGINE 200 MG/1
TABLET ORAL
Qty: 360 TABLET | Refills: 3 | Status: SHIPPED | OUTPATIENT
Start: 2020-01-31 | End: 2020-12-21

## 2020-01-31 NOTE — TELEPHONE ENCOUNTER
Prior Authorization Retail Medication Request    Medication/Dose: Lamictal 200 mg  ICD code (if different than what is on RX):    Previously Tried and Failed:  See Chart  Rationale:  Renewal    Insurance Name:    Insurance ID:        Pharmacy Information (if different than what is on RX)  Name:    Phone:

## 2020-01-31 NOTE — TELEPHONE ENCOUNTER
Last Clinic Visit: 12/27/19  NV:  NONE  Filling per [facility] medication refill protocols - seizure medications.  Not all labs required.

## 2020-01-31 NOTE — TELEPHONE ENCOUNTER
Central Prior Authorization Team   [phone]    PA Initiation    Medication: Lamictal 200 mg  Insurance Company: [employer] - Phone [phone] Fax [phone]  [id number] [place] - [address]  Filling Pharmacy Phone: [phone]  Filling Pharmacy Fax: [phone]  Start Date: 1/31/2020

## 2020-02-03 NOTE — TELEPHONE ENCOUNTER
Prior Authorization Approval    Authorization Effective Date: 1/1/2020  Authorization Expiration Date: 1/30/2021  Medication: Lamictal 200 mg-PA APPROVED   Approved Dose/Quantity:  Reference #: CASE ID # [id number]   Insurance Company: [employer] - Phone [phone] Fax [phone]  Expected CoPay:       CoPay Card Available:      Foundation Assistance Needed:    Which Pharmacy is filling the prescription (Not needed for infusion/clinic administered): [id number] [place] [address]  Pharmacy Notified: Yes- **Instructed pharmacy to notify patient when script is ready to /ship.**   Patient Notified: Yes

## 2020-06-26 ENCOUNTER — TELEPHONE (OUTPATIENT)
Dept: NEUROLOGY | Facility: CLINIC | Age: 25
End: 2020-06-26

## 2020-06-26 DIAGNOSIS — G40.319 GENERALIZED CONVULSIVE EPILEPSY WITH INTRACTABLE EPILEPSY (H): ICD-10-CM

## 2020-06-26 NOTE — TELEPHONE ENCOUNTER
Prior Authorization Retail Medication Request    Medication/Dose: Fycompa 4mg  ICD code (if different than what is on RX):    Previously Tried and Failed:  See chart  Rationale:  renewal    Insurance Name:    Insurance ID:        Pharmacy Information (if different than what is on RX)  [facility]:  [id number] [place] [facility], [place] - [address]    Phone:

## 2020-06-29 RX ORDER — PERAMPANEL 4 MG/1
TABLET ORAL
Qty: 60 TABLET | Refills: 0 | Status: SHIPPED | OUTPATIENT
Start: 2020-06-29 | End: 2020-08-25

## 2020-06-29 NOTE — TELEPHONE ENCOUNTER
Central Prior Authorization Team   Phone: [phone]      PA Initiation    Medication: perampanel (FYCOMPA) 4 MG tablet   Insurance Company: [employer] - Phone [phone] Fax [phone]  [id number] [place], [place] - [address]  Filling Pharmacy Phone: [phone]  Filling Pharmacy Fax:    Start Date: 6/29/2020

## 2020-06-29 NOTE — TELEPHONE ENCOUNTER
perampanel (FYCOMPA) 4 MG  Last Written Prescription Date:  12/27/19  Last Fill Quantity: 60,   # refills: 3  Last Office Visit : 12/27/19  Future Office visit:  NONE    Routing refill request to provider for review/approval because:  Drug not on refill protocol / controlled substance

## 2020-06-29 NOTE — TELEPHONE ENCOUNTER
Prior Authorization Approval    Authorization Effective Date: 5/30/2020  Authorization Expiration Date: 9/27/2020  Medication: perampanel (FYCOMPA) 4 MG tablet - APPROVED  Approved Dose/Quantity:   Reference #:     Insurance Company: [employer] - Phone [phone] Fax [phone]  Expected CoPay:       CoPay Card Available:      Foundation Assistance Needed:    Which Pharmacy is filling the prescription (Not needed for infusion/clinic administered): [id number] [place] [facility], [place] - [address] [facility]  Pharmacy Notified: Yes-Pharmacy will notify patient when ready.  Patient Notified: No

## 2020-08-24 ENCOUNTER — TELEPHONE (OUTPATIENT)
Dept: NEUROLOGY | Facility: CLINIC | Age: 25
End: 2020-08-24

## 2020-08-24 DIAGNOSIS — G40.319 GENERALIZED CONVULSIVE EPILEPSY WITH INTRACTABLE EPILEPSY (H): ICD-10-CM

## 2020-08-25 NOTE — TELEPHONE ENCOUNTER
FYCOMPA 4 MG TABLET   Last Written Prescription Date:  6/29/2020  Last Fill Quantity: 60,   # refills: 0  Last Office Visit : 12/27/2019  Future Office visit:  None    Routing refill request to provider for review/approval because:  Drug not on the [facility], [facility] or [facility] refill protocol or controlled substance      Jennifer Pepper RN  Central Triage Red Flags/Med Refills

## 2020-08-25 NOTE — TELEPHONE ENCOUNTER
Prior Authorization Not Needed per Insurance    Medication: FYCOMPA 4 MG tablet - PA Not Needed  Insurance Company: [employer] - Phone [phone] Fax [phone]  Expected CoPay:      [id number] [place], [place] - [address] [facility]  Pharmacy Notified: Yes  Patient Notified: No    Left a message for pharmacy to call back if they are still getting a rejection that PA is needed.Otherwise, there is a PA on file with the insurance that expires 11/5/2020.

## 2020-10-07 ENCOUNTER — TELEPHONE (OUTPATIENT)
Dept: NEUROLOGY | Facility: CLINIC | Age: 25
End: 2020-10-07

## 2020-10-07 NOTE — TELEPHONE ENCOUNTER
Central Prior Authorization Team   [phone]    PA Initiation    Medication: perampanel (FYCOMPA) 4 MG tablet  Insurance Company: [employer] - Phone [phone] Fax [phone]  [id number] [place], [place] - [address]  Filling Pharmacy Phone: [phone]  Filling Pharmacy Fax: [phone]  Start Date: 10/7/2020

## 2020-10-08 NOTE — TELEPHONE ENCOUNTER
Prior Authorization Approval    Authorization Effective Date: 9/7/2020  Authorization Expiration Date: 1/5/2021  Medication: perampanel (FYCOMPA) 4 MG tablet-PA APPROVED   Approved Dose/Quantity:   Reference #: CASE # [id number]   Insurance Company: [employer] - Phone [phone] Fax [phone]  Expected CoPay:       CoPay Card Available:      Foundation Assistance Needed:    Which Pharmacy is filling the prescription (Not needed for infusion/clinic administered): [id number] [place] [facility] [place], [place] - [address]  Pharmacy Notified: Yes-Pharmacy stated that last filled was on 9/25/2020 for 30 day supply and next fill date is 10/22/2020. Pharmacy noted PA Approval in patients chart and will fill on next fill date and notify patient when medication is ready for    Patient Notified: Yes

## 2020-11-20 ENCOUNTER — TELEPHONE (OUTPATIENT)
Dept: NEUROLOGY | Facility: CLINIC | Age: 25
End: 2020-11-20

## 2020-11-20 DIAGNOSIS — G40.319 GENERALIZED CONVULSIVE EPILEPSY WITH INTRACTABLE EPILEPSY (H): ICD-10-CM

## 2020-11-20 NOTE — TELEPHONE ENCOUNTER
Prior Authorization Retail Medication Request    Medication/Dose:   ICD code (if different than what is on RX):  Lamictal 200 mg tab  Previously Tried and Failed:    Rationale:      Insurance Name:    Insurance ID:        Pharmacy Information (if different than what is on RX)  Name:    Phone:

## 2020-11-23 NOTE — TELEPHONE ENCOUNTER
Noted refill request  Patient last seen 12/27/2019, with 12 month follow up recommended.  No follow up has been made.  Last prescription was Jan 2020, 3 months good for one year refills.  Will message scheduling to contact patient/caregivers to make an appointment for ongoing medication management

## 2020-11-23 NOTE — TELEPHONE ENCOUNTER
Prior Authorization Not Needed per Insurance    Medication: LAMICTAL 200 MG tablet-PA NOT NEEDED   Insurance Company: [employer] - Phone [phone] Fax [phone]  Expected CoPay: NO CO-PAY    [id number] [place] [address]  Pharmacy Notified: Yes  Patient Notified: No    Called pharmacy and pharmacy stated that PA is Not Needed and medication is covered. Pharmacy stated that they have a paid claim on medication quantity 120 for 30 day supply on 11/20/2020 and patient has picked up medication. Pharmacy is requesting new script with refills be sent to filling pharmacy at [facility] [id number] [place] [address].

## 2020-11-24 RX ORDER — LAMOTRIGINE 200 MG/1
TABLET ORAL
Qty: 360 TABLET | Refills: 3 | OUTPATIENT
Start: 2020-11-24

## 2020-12-16 DIAGNOSIS — G40.319 GENERALIZED CONVULSIVE EPILEPSY WITH INTRACTABLE EPILEPSY (H): ICD-10-CM

## 2020-12-21 NOTE — TELEPHONE ENCOUNTER
LAMICTAL 200 MG TABLET      Last Written Prescription Date:  1/31/2020  Last Fill Quantity: 360,   # refills: 3  Last Office Visit : 12/27/2019  Future Office visit:  None  Routing refill request to provider for review/approval because:  Would Provider like a visit for Pt care?   Coming up on a year since last visit?   Or is it okay to continue the same dose for Pt care?    Please advise       Thank you       Jennifer Pepper RN  Central Triage Red Flags/Med Refills

## 2020-12-22 ENCOUNTER — TELEPHONE (OUTPATIENT)
Dept: NEUROLOGY | Facility: CLINIC | Age: 25
End: 2020-12-22

## 2020-12-22 RX ORDER — LAMOTRIGINE 200 MG/1
TABLET ORAL
Qty: 360 TABLET | Refills: 0 | Status: SHIPPED | OUTPATIENT
Start: 2020-12-22 | End: 2020-12-29

## 2020-12-22 NOTE — TELEPHONE ENCOUNTER
Call received from patient's mother, via  warm transfer.    Mom was asking for prior authorizations for fycompa and lamictal.    Further discussion revealed that there was some confusion of her understanding about the difference between prior authorization, and refill request.    I reviewed the chart while mother was on the phone  Lamictal Brand VOSS refill was requested earlier this week, and authorized this morning.  Last PA request showed no PA is needed for Lamictal Brand Name    Fycompa prescription from August noted to have additional refills still available.  Fycompa PA reviewed, and it is still in effect (until 1/5/2021)  This was discussed with mother.    We also discussed that a visit with the MD is needed at least yearly, or sooner if the provider recommends, to continue managing medications  Mom notes she has now made a virtual visit appointment for December 29th.    No other questions at this time  Instructed to call if questions or concerns arise

## 2020-12-29 ENCOUNTER — VIRTUAL VISIT (OUTPATIENT)
Dept: NEUROLOGY | Facility: CLINIC | Age: 25
End: 2020-12-29
Payer: COMMERCIAL

## 2020-12-29 DIAGNOSIS — G40.319 GENERALIZED CONVULSIVE EPILEPSY WITH INTRACTABLE EPILEPSY (H): ICD-10-CM

## 2020-12-29 RX ORDER — LAMOTRIGINE 200 MG/1
TABLET ORAL
Qty: 360 TABLET | Refills: 3 | Status: SHIPPED | OUTPATIENT
Start: 2020-12-29 | End: 2021-12-08

## 2020-12-29 NOTE — LETTER
"2020       RE: Zach Gurrola  : 1995   MRN: [id number]      Dear Colleague,    Thank you for referring your patient, Zach Gurrola, to the [facility] EPILEPSY CARE at [facility]. Please see a copy of my visit note below.    Interval HX: Continues to be sz free. No daytime sz since 2015. Dad reports 2 episodes of severe dizziness in  evening.obably took extra lamo last year.    Prior to Admission medications    Medication Sig Start Date End Date Taking? 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  He has allergies to Dilantin and Depakote and he has failed generic Lamictal.  Neuropsychological testing 2010 indicated full scale IQ of 66, but this may have been somewhat influenced by his lack of cooperation.  Nevertheless, the impression was that the test results revealed considerable evidence of generalized cerebral dysfunction, moderate in overall degree and reflected in widespread intellectual limitations and cognitive deficits and scattered diffuse features.He also has ADHD.      His last EEG was in 2016 and was interpreted as moderate disturbance of background activity with frequent electrographic interictal and ictal activity which was generalized. Not significantly changed from previous in    He had a presurgical evaluation at [place] in  and also had a [facility] evaluation in .  MRI scan at [facility] 2011 was essentially normal, with no evidence of mesial temporal sclerosis.    ROS: has small lesions on arm and back. Bone in left wrist \"no blood supply\" Otherwise negative for remainder of 14 " "point ros   EXAMINATION: Now tall, thin. He was more verbal and interacted well and cooperated with the examination than last time.  Speech was fluent.  Extraocular movements were full without nystagmus.  Pupils reacted briskly to light.  Facial movements were symmetrical.  Swallowing was normal.  Hearing was intact.  Visual fields were full to confrontation.  Finger-nose-finger was intact.  Tandem gait was normal. Muscle tone and strength and coordination in the upper and lower extremities were normal.      ASSESSMENT:  Daytime Sz now under control.  Taper of ZNS copmpleted.  Was working but layed off. Living at home but life is good.On high dose of LAMO so overdose would be accomplished by extra 200.  Applying for social security   Plan:  1) Continue LAMO 200  1-1-2.= 800  2) Continue perampanel at 4 mg hs   3) RTC 10 mo   4) LAMO level   5) Neuropsych  test     Zach Gurrola is a 25 year old male who is being evaluated via a billable video visit.      The patient has been notified of following:     \"This video visit will be conducted via a call between you and your physician/provider. We have found that certain health care needs can be provided without the need for an in-person physical exam.  This service lets us provide the care you need with a video conversation.  If a prescription is necessary we can send it directly to your pharmacy.  If lab work is needed we can place an order for that and you can then stop by our lab to have the test done at a later time.    Video visits are billed at different rates depending on your insurance coverage.  Please reach out to your insurance provider with any questions.    If during the course of the call the physician/provider feels a video visit is not appropriate, you will not be charged for this service.\"    Patient has given verbal consent for Video visit? Yes  How would you like to obtain your AVS? Mail a copy  If you are dropped from the video visit, the video " invite should be resent to: Send to e-mail at: [email or web address]  Will anyone else be joining your video visit? No        Video-Visit Details    Type of service:  Video Visit    Video Start Time: 3:10  Video End Time: 3:31    Originating Location (pt. Location): Home    Distant Location (provider location):  [facility] EPILEPSY CARE     Platform used for Video Visit: Keyona Holden MD

## 2020-12-29 NOTE — PROGRESS NOTES
"Interval HX: Continues to be sz free. No daytime sz since July 2015. Dad reports 2 episodes of severe dizziness in  evening.obably took extra lamo last year.    Prior to Admission medications    Medication Sig Start Date End Date Taking? 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  He has allergies to Dilantin and Depakote and he has failed generic Lamictal.  Neuropsychological testing 06/30/2010 indicated full scale IQ of 66, but this may have been somewhat influenced by his lack of cooperation.  Nevertheless, the impression was that the test results revealed considerable evidence of generalized cerebral dysfunction, moderate in overall degree and reflected in widespread intellectual limitations and cognitive deficits and scattered diffuse features.He also has ADHD.      His last EEG was in 2/22/2016 and was interpreted as moderate disturbance of background activity with frequent electrographic interictal and ictal activity which was generalized. Not significantly changed from previous in 2012   He had a presurgical evaluation at [place] in 2007 and also had a [facility] evaluation in 2004.  MRI scan at [facility] 04/2011 was essentially normal, with no evidence of mesial temporal sclerosis.    ROS: has small lesions on arm and back. Bone in left wrist \"no blood supply\" Otherwise negative for remainder of 14 point ros   EXAMINATION: Now tall, thin. He was more verbal and interacted well and cooperated with the examination than last time.  Speech was fluent.  Extraocular movements were full without nystagmus.  Pupils reacted briskly to light.  Facial movements were symmetrical.  " "Swallowing was normal.  Hearing was intact.  Visual fields were full to confrontation.  Finger-nose-finger was intact.  Tandem gait was normal. Muscle tone and strength and coordination in the upper and lower extremities were normal.      ASSESSMENT:  Daytime Sz now under control.  Taper of ZNS copmpleted.  Was working but layed off. Living at home but life is good.On high dose of LAMO so overdose would be accomplished by extra 200.  Applying for social security   Plan:  1) Continue LAMO 200  1-1-2.= 800  2) Continue perampanel at 4 mg hs   3) RTC 10 mo   4) LAMO level   5) Neuropsych  test     Zach Gurrola is a 25 year old male who is being evaluated via a billable video visit.      The patient has been notified of following:     \"This video visit will be conducted via a call between you and your physician/provider. We have found that certain health care needs can be provided without the need for an in-person physical exam.  This service lets us provide the care you need with a video conversation.  If a prescription is necessary we can send it directly to your pharmacy.  If lab work is needed we can place an order for that and you can then stop by our lab to have the test done at a later time.    Video visits are billed at different rates depending on your insurance coverage.  Please reach out to your insurance provider with any questions.    If during the course of the call the physician/provider feels a video visit is not appropriate, you will not be charged for this service.\"    Patient has given verbal consent for Video visit? Yes  How would you like to obtain your AVS? Mail a copy  If you are dropped from the video visit, the video invite should be resent to: Send to e-mail at: [email or web address]  Will anyone else be joining your video visit? No        Video-Visit Details    Type of service:  Video Visit    Video Start Time: 3:10  Video End Time: 3:31    Originating Location (pt. Location): " Home    Distant Location (provider location):  [facility] EPILEPSY CARE     Platform used for Video Visit: Keyona Holden MD

## 2021-01-04 DIAGNOSIS — G40.319 GENERALIZED CONVULSIVE EPILEPSY WITH INTRACTABLE EPILEPSY (H): Primary | ICD-10-CM

## 2021-02-17 ENCOUNTER — TELEPHONE (OUTPATIENT)
Dept: NEUROLOGY | Facility: CLINIC | Age: 26
End: 2021-02-17

## 2021-02-17 NOTE — TELEPHONE ENCOUNTER
Prior Authorization Retail Medication Request    Medication/Dose:   ICD code (if different than what is on RX):  fycompa 4 mg tabs  Previously Tried and Failed:    Rationale:      Insurance Name:    Insurance ID:        Pharmacy Information (if different than what is on RX)  Name:    Phone:

## 2021-02-18 NOTE — TELEPHONE ENCOUNTER
Central Prior Authorization Team   [phone]    PA Initiation    Medication: perampanel (FYCOMPA) 4 MG tablet  Insurance Company: [employer] - Phone [phone] Fax [phone]  [id number] [place], [place] - [address]  Filling Pharmacy Phone: [phone]  Filling Pharmacy Fax: [phone]  Start Date: 2/18/2021

## 2021-02-18 NOTE — TELEPHONE ENCOUNTER
Prior Authorization Approval    Authorization Effective Date: 1/19/2021  Authorization Expiration Date: 2/18/2022  Medication: perampanel (FYCOMPA) 4 MG tablet-PA APPROVED  Approved Dose/Quantity:   Reference #: CASE # [id number]   Insurance Company: [employer] - Phone [phone] Fax [phone]  Expected CoPay:       CoPay Card Available:      Foundation Assistance Needed:    Which Pharmacy is filling the prescription (Not needed for infusion/clinic administered): [id number] [place] [facility] [place] [facility] [address] [facility]  Pharmacy Notified: Yes- **Instructed pharmacy to notify patient when script is ready to /ship.**  Patient Notified: Yes

## 2021-05-20 ENCOUNTER — TELEPHONE (OUTPATIENT)
Dept: NEUROLOGY | Facility: CLINIC | Age: 26
End: 2021-05-20

## 2021-05-20 DIAGNOSIS — G40.319 GENERALIZED CONVULSIVE EPILEPSY WITH INTRACTABLE EPILEPSY (H): ICD-10-CM

## 2021-05-20 NOTE — TELEPHONE ENCOUNTER
Central Prior Authorization Team   [phone]    PA Initiation    Medication:   Insurance Company: [employer] - Phone [phone] Fax [phone]  [id number] [place]  [address]  Filling Pharmacy Phone: [phone]  Filling Pharmacy Fax: [phone]  Start Date: 5/20/2021

## 2021-05-20 NOTE — TELEPHONE ENCOUNTER
Prior Authorization Retail Medication Request    Medication/Dose: LAMICTAL 200 MG tablet  ICD code (if different than what is on RX):    Previously Tried and Failed:    Rationale:      Insurance Name:  [facility]  Insurance ID:  [id number]      Pharmacy Information (if different than what is on RX)  Name:    Phone:

## 2021-05-21 RX ORDER — LEVETIRACETAM 100 MG/ML
SOLUTION ORAL
Refills: 0 | OUTPATIENT
Start: 2021-05-21

## 2021-05-24 NOTE — TELEPHONE ENCOUNTER
Prior Authorization Approval    Authorization Effective Date: 4/20/2021  Authorization Expiration Date: 5/21/2022  Medication: LAMICTAL 200 MG tablet-PA APPROVED   Approved Dose/Quantity:   Reference #: CASE #[id number]   Insurance Company: [employer] - Phone [phone] Fax [phone]  Expected CoPay:       CoPay Card Available:      Foundation Assistance Needed:    Which Pharmacy is filling the prescription (Not needed for infusion/clinic administered): [id number] [place] [address]  Pharmacy Notified: Yes- **Instructed pharmacy to notify patient when script is ready to /ship.**  Patient Notified: Yes

## 2021-07-03 DIAGNOSIS — G40.319 GENERALIZED CONVULSIVE EPILEPSY WITH INTRACTABLE EPILEPSY (H): ICD-10-CM

## 2021-07-05 NOTE — TELEPHONE ENCOUNTER
FYCOMPA 4 MG TABLET      Last Written Prescription Date:  12-  Last Fill Quantity: 30,   # refills: 5  Last Office Visit : 12-  Future Office visit:  none    Routing refill request to provider for review/approval because:  Controlled medication.      Kathleen M Doege RN

## 2021-12-07 ENCOUNTER — TELEPHONE (OUTPATIENT)
Dept: NEUROLOGY | Facility: CLINIC | Age: 26
End: 2021-12-07
Payer: COMMERCIAL

## 2021-12-07 DIAGNOSIS — G40.319 GENERALIZED CONVULSIVE EPILEPSY WITH INTRACTABLE EPILEPSY (H): ICD-10-CM

## 2021-12-07 NOTE — TELEPHONE ENCOUNTER
Prior Authorization Not Needed per Insurance    Medication: LAMICTAL 200 MG tablet  Insurance Company: [employer] - Phone [phone] Fax [phone]  Expected CoPay:      [id number] [place] [facility] [place]  Pharmacy Notified: Yes  Patient Notified: No    Called pharmacy as there is an approval that is good until May 2022. Per pharmacy tech, they are requesting refills.

## 2021-12-08 RX ORDER — LAMOTRIGINE 200 MG/1
TABLET ORAL
Qty: 360 TABLET | Refills: 0 | Status: SHIPPED | OUTPATIENT
Start: 2021-12-08 | End: 2022-01-04

## 2021-12-08 NOTE — TELEPHONE ENCOUNTER
Chart reviewed.  Lamictal YULY refill request  Patient has an appointment coming up in December, last seen December 2020  Refill x90 days as per protocol

## 2022-01-04 ENCOUNTER — VIRTUAL VISIT (OUTPATIENT)
Dept: NEUROLOGY | Facility: CLINIC | Age: 27
End: 2022-01-04
Payer: COMMERCIAL

## 2022-01-04 DIAGNOSIS — G40.319 GENERALIZED CONVULSIVE EPILEPSY WITH INTRACTABLE EPILEPSY (H): ICD-10-CM

## 2022-01-04 RX ORDER — LAMOTRIGINE 200 MG/1
TABLET ORAL
Qty: 360 TABLET | Refills: 3 | Status: SHIPPED | OUTPATIENT
Start: 2022-01-04 | End: 2022-11-11

## 2022-01-04 NOTE — LETTER
"2022     RE: Zach Gurrola  : 1995   MRN: [id number]      Dear Colleague,    Thank you for referring your patient, Zach Gurrola, to the [facility] EPILEPSY CARE at [facility]. Please see a copy of my visit note below.    Zach is a 26 year old who is being evaluated via a billable video visit.      Interval HX: Continues to be sz free. No daytime sz since 2015.   Mom also on video    Prior to Admission medications    Medication Sig Start Date End Date Taking? 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  He has allergies to Dilantin and Depakote and he has failed generic Lamictal.  Neuropsychological testing 2010 indicated full scale IQ of 66, but this may have been somewhat influenced by his lack of cooperation.  Nevertheless, the impression was that the test results revealed considerable evidence of generalized cerebral dysfunction, moderate in overall degree and reflected in widespread intellectual limitations and cognitive deficits and scattered diffuse features.He also has ADHD.      His last EEG was in 2016 and was interpreted as moderate disturbance of background activity with frequent electrographic interictal and ictal activity which was generalized. Not significantly changed from previous in    He had a presurgical evaluation at [place] in  and also had a [facility] evaluation in .  MRI scan at [facility] 2011 was essentially normal, with no evidence of mesial temporal sclerosis.    ROS: has small lesions on arm and back. Bone in left wrist \"no blood supply\" Otherwise negative for " "remainder of 14 point ros   EXAMINATION: Now tall, thin. He was more verbal and interacted well and cooperated with the examination than last time.  Speech was fluent.  Extraocular movements were full without nystagmus.  Pupils reacted briskly to light.  Facial movements were symmetrical.  Swallowing was normal.  Hearing was intact.  Visual fields were full to confrontation.  Finger-nose-finger was intact.  Tandem gait was normal. Muscle tone and strength and coordination in the upper and lower extremities were normal.      ASSESSMENT:  Daytime Sz now under control.  Taper of ZNS copmpleted.  Was working but layed off. Living at home but life is good.On high dose of LAMO so overdose would be accomplished by extra 200. Completed driving learning. Needs form for diving completed. Meds discussed. Not vaccinated. covid risks disussed  Applying for social security   Plan:  1) Continue LAMO 200  1-1-2.= 800  2) Continue perampanel at 4 mg hs   3) RTC 10 mo   4) LAMO level       The patient has been notified of following:     \"This video visit will be conducted via a call between you and your physician/provider. We have found that certain health care needs can be provided without the need for an in-person physical exam.  This service lets us provide the care you need with a video conversation.  If a prescription is necessary we can send it directly to your pharmacy.  If lab work is needed we can place an order for that and you can then stop by our lab to have the test done at a later time.    How would you like to obtain your AVS? Mail a copy  If the video visit is dropped, the invitation should be resent by: Send to e-mail at: [email or web address]  Will anyone else be joining your video visit? No    Video Start Time: 3:05  Video-Visit Details  Type of service:  Video Visit  Video End Time:3:35  Originating Location (pt. Location): Home  Distant Location (provider location):  [facility] EPILEPSY CARE   Platform used for Video " Visit: AmWell  Time Pre visit 2 min: face to face 30; post visit 2 min    Again, thank you for allowing me to participate in the care of your patient.      Sincerely,    Panda Holden MD

## 2022-01-04 NOTE — PROGRESS NOTES
"Zach is a 26 year old who is being evaluated via a billable video visit.      Interval HX: Continues to be sz free. No daytime sz since July 2015.   Mom also on video    Prior to Admission medications    Medication Sig Start Date End Date Taking? 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  He has allergies to Dilantin and Depakote and he has failed generic Lamictal.  Neuropsychological testing 06/30/2010 indicated full scale IQ of 66, but this may have been somewhat influenced by his lack of cooperation.  Nevertheless, the impression was that the test results revealed considerable evidence of generalized cerebral dysfunction, moderate in overall degree and reflected in widespread intellectual limitations and cognitive deficits and scattered diffuse features.He also has ADHD.      His last EEG was in 2/22/2016 and was interpreted as moderate disturbance of background activity with frequent electrographic interictal and ictal activity which was generalized. Not significantly changed from previous in 2012   He had a presurgical evaluation at [place] in 2007 and also had a [facility] evaluation in 2004.  MRI scan at [facility] 04/2011 was essentially normal, with no evidence of mesial temporal sclerosis.    ROS: has small lesions on arm and back. Bone in left wrist \"no blood supply\" Otherwise negative for remainder of 14 point ros   EXAMINATION: Now tall, thin. He was more verbal and interacted well and cooperated with the examination than last time.  Speech was fluent.  Extraocular movements were full without nystagmus.  Pupils reacted briskly to light.  Facial movements were " "symmetrical.  Swallowing was normal.  Hearing was intact.  Visual fields were full to confrontation.  Finger-nose-finger was intact.  Tandem gait was normal. Muscle tone and strength and coordination in the upper and lower extremities were normal.      ASSESSMENT:  Daytime Sz now under control.  Taper of ZNS copmpleted.  Was working but layed off. Living at home but life is good.On high dose of LAMO so overdose would be accomplished by extra 200. Completed driving learning. Needs form for diving completed. Meds discussed. Not vaccinated. covid risks disussed  Applying for social security   Plan:  1) Continue LAMO 200  1-1-2.= 800  2) Continue perampanel at 4 mg hs   3) RTC 10 mo   4) LAMO level            The patient has been notified of following:     \"This video visit will be conducted via a call between you and your physician/provider. We have found that certain health care needs can be provided without the need for an in-person physical exam.  This service lets us provide the care you need with a video conversation.  If a prescription is necessary we can send it directly to your pharmacy.  If lab work is needed we can place an order for that and you can then stop by our lab to have the test done at a later time.      How would you like to obtain your AVS? Mail a copy  If the video visit is dropped, the invitation should be resent by: Send to e-mail at: [email or web address]  Will anyone else be joining your video visit? No      Video Start Time: 3:05  Video-Visit Details    Type of service:  Video Visit    Video End Time:3:35    Originating Location (pt. Location): Home    Distant Location (provider location):  [facility] EPILEPSY CARE     Platform used for Video Visit: AmWell  Time Pre visit 2 min: face to face 30; post visit 2 min  "

## 2022-02-01 ENCOUNTER — TELEPHONE (OUTPATIENT)
Dept: NEUROLOGY | Facility: CLINIC | Age: 27
End: 2022-02-01
Payer: COMMERCIAL

## 2022-02-03 NOTE — TELEPHONE ENCOUNTER
Prior Authorization Not Needed per Insurance    Medication: LAMICTAL 200 MG tablet-PA NOT NEEDED   Insurance Company: [employer] - Phone [phone] Fax [phone]  Expected CoPay:      [id number] [place] [place] [facility] [address]  Pharmacy Notified: Yes  Patient Notified: No    Called pharmacy and pharmacy stated that PA is Not Needed and medication is covered. **Instructed pharmacy to notify patient when script is ready to /ship.** Pharmacy stated that they have a paid claim on medication on 12/26/2021 quantity 360 for 90 day supply and patient has picked up medication. Brenda from filling pharmacy stated that currently medication is a refill too soon and next available fill date is 3/1/2022.

## 2022-03-01 DIAGNOSIS — G40.319 GENERALIZED CONVULSIVE EPILEPSY WITH INTRACTABLE EPILEPSY (H): ICD-10-CM

## 2022-03-02 NOTE — TELEPHONE ENCOUNTER
levETIRAcetam (KEPPRA) 100 MG/ML solution  Please send new order for Keppra.  Lamictal not covered under Pt's insurance.  Please send new updated order with Providers signature.   Thank you      Jennifer Pepper RN  Central Triage Red Flags/Med Refills

## 2022-03-03 ENCOUNTER — TELEPHONE (OUTPATIENT)
Dept: NEUROLOGY | Facility: CLINIC | Age: 27
End: 2022-03-03

## 2022-03-03 RX ORDER — LEVETIRACETAM 100 MG/ML
SOLUTION ORAL
Refills: 0 | OUTPATIENT
Start: 2022-03-03

## 2022-03-03 NOTE — TELEPHONE ENCOUNTER
There is currently a P/A approval on file that is effective until 05/21/2022. See encounters from 02/01/2022, 12/07/2021, and  05/20/2021.        Medication: LAMICTAL 200 MG tablet  Insurance Company: [employer] - Phone [phone] Fax [phone]  Expected CoPay:      [id number] [place], [place] [facility] [address]  Pharmacy Notified: No  Patient Notified: No

## 2022-03-03 NOTE — TELEPHONE ENCOUNTER
Patient very low on medication, asked for urgent request    Prior Authorization Retail Medication Request    Medication/Dose:  LAMICTAL 200 MG tablet    ICD code (if different than what is on RX):    Previously Tried and Failed:    Rationale:      Insurance Name:  [employer]  Insurance ID:        Pharmacy Information (if different than what is on RX)  Name:    Phone:

## 2022-03-03 NOTE — TELEPHONE ENCOUNTER
Dr. Holden,    I called the Pharmacy on 3/3/2022 @ 4:14 PM.        I spoke with the pharmacist directly.   They need a prior authorization sent so this med is covered for Pt care.     If any questions regarding this.   Here is the #[phone] to speak with the insurance company directly if need by.         Thank you     Jennifer Pepper RN  Central Triage Red Flags/Med Refills            Panda Holden MD  You 9 minutes ago (3:57 PM)     [place]    Hi Why do I have  to change??? Have done appeal? I do not change drug just because it is not covered; it may not be  good f=drug for the patient.     Panda

## 2022-03-05 RX ORDER — LAMOTRIGINE 200 MG/1
200 TABLET ORAL DAILY
Qty: 31 TABLET | Refills: 11 | Status: SHIPPED | OUTPATIENT
Start: 2022-03-05 | End: 2023-03-31

## 2022-03-07 ENCOUNTER — TELEPHONE (OUTPATIENT)
Dept: NEUROLOGY | Facility: CLINIC | Age: 27
End: 2022-03-07

## 2022-03-07 NOTE — TELEPHONE ENCOUNTER
Received call from patient's Reyna [phone] calling in regards to the patient's Lamictal 200 MG - still not processing for the medication: [id number] [place] - [address] [phone] (phone) is saying it is not processing.

## 2022-03-07 NOTE — TELEPHONE ENCOUNTER
Central Prior Authorization Team   Phone: [phone]      Patient's mom calling stating that that per the pharmacy, brand Lamitcal is not processing and the pharmacy is processing for 3.5 tablets and not 4 per day.    Received call from patient's Reyna [phone] calling in regards to the patient's Lamictal 200 MG - still not processing for the medication: [id number] [place] - [address] [phone] (phone) is saying it is not processing.

## 2022-03-07 NOTE — TELEPHONE ENCOUNTER
Central Prior Authorization Team   Phone: [phone]      Prior Authorization Not Needed per Insurance    Medication: LAMICTAL 200 MG tablet  Insurance Company: [employer] - Phone [phone] Fax [phone]  Expected CoPay:      [id number] [place] [facility] [address]  Pharmacy Notified: Yes *received call back from the pharmacy - the help desk was able to help them with the override and it  Processed - they can have the Rx ready within the hour*  Patient Notified: Yes I called and spoke with the patient's mother to inform her that the Rx will be ready within the hour.    I called [facility] PA department and per the PA rep, the Non-formulary Prior Authorization is still effective until 05/1/22 and that the rejection that the pharmacy is receiving a Max Dosage of 3.5; per the [facility] PA rep, there isn't a quantity limit case for the medication that can be done and that the pharmacy would need to call to get the override code for the medication.      I did call to inform the pharmacy and spoke with a technician stating that a call to the pharmacy help desk is needed for help with the override. She did state she might need to have to call tomorrow as they might be closed - I did let her know that the help desk should be open and able to help. I did provide my number and name and ask for a call back.    I called the patient' mom and informed her of what was going on and that I am waiting for a call back from the pharmacy and that if I can call in the AM if I do not get updated by the time I leave today.  I let Reyna know that I would also route this to the provider's care team to as notice incase they in the future get a call in regards to the patient's dosage.

## 2022-04-15 NOTE — TELEPHONE ENCOUNTER
----- Message from Rianna Madison sent at 8/2/2018  3:09 PM CDT -----  Regarding: RE: no RTC,needs to be seen  Patient needs to be seen in January 2019.  Spoke with patient's mom.  Please fill meds until Jaunary 2019.    Thanks, Rianna  ----- Message -----     From: Griselda Vance, [facility]     Sent: 7/18/2018   3:43 PM       To: Rianna Madison, Me [facility]  Pool  Subject: no RTC,needs to be seen                               Pediatric H&P Note      History Of Present Illness  Bao is a 15 year old male with PMH of chronic kidney disease secondary to chronic thrombotic micrangiopathy due to a history of atypical HUS who is a direct admit for Eculizumab infusion. Last infusion on 4/8. No complications during or after infusion.     Patient says he has not had any recent fevers or URI symptoms. No changes in urination. No vision changes or headaches. Followed up with Dr. Navarrete outpatient on 4/11. No new concerns.        Past Medical History   has a past medical history of Anemia, Essential (primary) hypertension, and HUS (hemolytic uremic syndrome), atypical (CMS/HCC).     - Initial presentation 2008 at 6 months of age  - April 2014 - Admitted due to microangiopathic hemolytic anemia, thrombocytopenia, nephrotic range proteinuria, MONIK, and HTN - received pRBC transfusion x 2 and started on 4 anti-hypertensives and sevelamer  - April 2014 - Renal biopsy performed, suggestive of C3GN  - Normal C3, C4  - Normal CD20, Factor H, and Factor I  - September 2015 - admission for dehydration and fatigue, but with normal CBC and CMP, + hematuria and proteinuria  - February 2016 - Pneumonia and HTN, anemic to 9.7, 24 hour urine protein 2.1 g    Repeat renal biopsy with chronic thrombotic microangiopathy with marked global glomerular sclerosis and marked chronic tubulointerstitial changes     Surgical History   has a past surgical history that includes Surgical Pathology, Kidney Biopsy and Circumcision baby.     Social History   reports that he has never smoked. He has never used smokeless tobacco. He reports that he does not drink alcohol and does not use drugs.    Family History  Maternal Ggrandmother DM   Paternal Ggrandmmother DM  Maternal uncle aneurysm   Maternal grandmother HTN     Allergies  ALLERGIES:  Ibuprofen    Medications  Medications Prior to Admission   Medication Sig Dispense Refill   • amlodipine-benazepril (LOTREL) 5-20 MG per capsule  Take 1 capsule by mouth daily. 90 capsule 3       Immunizations  Up to date     Outpatient Pediatrician  Dr. Steven MENDENHALL  Review of Systems   Constitutional: Negative for activity change and fever.   HENT: Negative for congestion and rhinorrhea.    Eyes: Negative for discharge and visual disturbance.   Respiratory: Negative for chest tightness.    Cardiovascular: Negative for chest pain.   Gastrointestinal: Negative for abdominal pain and vomiting.   Genitourinary: Negative for decreased urine volume.   Musculoskeletal: Negative for myalgias.   Neurological: Negative for weakness and headaches.   Psychiatric/Behavioral: Negative for confusion.           Physical Exam  Visit Vitals  BP (!) 145/90 (BP Location: LLE - Left lower extremity, Patient Position: Sitting)   Pulse 100   Temp 98.2 °F (36.8 °C) (Oral)   Resp 16   Ht 5' 8.5\" (1.74 m)   Wt 79.8 kg (175 lb 14.8 oz)   SpO2 98%   BMI 26.36 kg/m²        No intake or output data in the 24 hours ending 04/15/22 1002      Physical Exam  Vitals reviewed.   Constitutional:       General: He is not in acute distress.     Appearance: Normal appearance. He is normal weight. He is not ill-appearing or diaphoretic.   HENT:      Head: Normocephalic and atraumatic.      Right Ear: External ear normal.      Left Ear: External ear normal.      Nose: Nose normal.      Mouth/Throat:      Mouth: Mucous membranes are moist.      Pharynx: Oropharynx is clear.      Neck: Normal range of motion and neck supple.   Eyes:      Extraocular Movements: Extraocular movements intact.      Conjunctiva/sclera: Conjunctivae normal.      Pupils: Pupils are equal, round, and reactive to light.   Cardiovascular:      Rate and Rhythm: Normal rate and regular rhythm.      Heart sounds: Normal heart sounds. No murmur heard.  Pulmonary:      Effort: Pulmonary effort is normal. No respiratory distress.      Breath sounds: Normal breath sounds.   Abdominal:      General: Abdomen is flat. There is no  distension.      Palpations: Abdomen is soft.      Tenderness: There is no abdominal tenderness.   Musculoskeletal:         General: No swelling or tenderness. Normal range of motion.   Skin:     General: Skin is warm and dry.      Capillary Refill: Capillary refill takes less than 2 seconds.      Findings: No rash.   Neurological:      General: No focal deficit present.      Mental Status: He is alert and oriented to person, place, and time.   Psychiatric:         Mood and Affect: Mood normal.         Behavior: Behavior normal.            LABORATORY DATA:    No visits with results within 1 Day(s) from this visit.   Latest known visit with results is:   Admission on 04/08/2022, Discharged on 04/08/2022   Component Date Value Ref Range Status   • Sodium 04/08/2022 141  135 - 145 mmol/L Final   • Potassium 04/08/2022 4.2  3.4 - 5.1 mmol/L Final   • Chloride 04/08/2022 113 (A) 98 - 107 mmol/L Final   • Carbon Dioxide 04/08/2022 23  21 - 32 mmol/L Final   • Anion Gap 04/08/2022 9 (A) 10 - 20 mmol/L Final   • Glucose 04/08/2022 106 (A) 70 - 99 mg/dL Final   • BUN 04/08/2022 28 (A) 6 - 20 mg/dL Final   • Creatinine 04/08/2022 1.17 (A) 0.38 - 1.15 mg/dL Final   • Glomerular Filtration Rate 04/08/2022    Final    GFR not calculated for age less than 18 years   • BUN/ Creatinine Ratio 04/08/2022 24  7 - 25 Final   • Calcium 04/08/2022 7.8 (A) 8.0 - 11.0 mg/dL Final   • Bilirubin, Total 04/08/2022 0.4  0.2 - 1.0 mg/dL Final   • GOT/AST 04/08/2022 18  10 - 45 Units/L Final   • GPT/ALT 04/08/2022 18  10 - 50 Units/L Final   • Alkaline Phosphatase 04/08/2022 151  110 - 450 Units/L Final   • Albumin 04/08/2022 3.2 (A) 3.6 - 5.1 g/dL Final   • Protein, Total 04/08/2022 6.2  6.0 - 8.0 g/dL Final   • Globulin 04/08/2022 3.0  2.0 - 4.0 g/dL Final   • A/G Ratio 04/08/2022 1.1  1.0 - 2.4 Final   • WBC 04/08/2022 10.9  4.2 - 11.0 K/mcL Final   • RBC 04/08/2022 3.76 (A) 3.90 - 5.30 mil/mcL Final   • HGB 04/08/2022 11.3 (A) 13.0 - 17.0  g/dL Final   • HCT 04/08/2022 33.3 (A) 39.0 - 51.0 % Final   • MCV 04/08/2022 88.6  78.0 - 100.0 fl Final   • MCH 04/08/2022 30.1  26.0 - 34.0 pg Final   • MCHC 04/08/2022 33.9  32.0 - 36.5 g/dL Final   • RDW-CV 04/08/2022 13.1  11.0 - 15.0 % Final   • RDW-SD 04/08/2022 42.7  35.0 - 47.0 fL Final   • PLT 04/08/2022 306  140 - 450 K/mcL Final   • NRBC 04/08/2022 0  <=0 /100 WBC Final   • Neutrophil, Percent 04/08/2022 69  % Final   • Lymphocytes, Percent 04/08/2022 18  % Final   • Mono, Percent 04/08/2022 8  % Final   • Eosinophils, Percent 04/08/2022 3  % Final   • Basophils, Percent 04/08/2022 1  % Final   • Immature Granulocytes 04/08/2022 1  % Final   • Absolute Neutrophils 04/08/2022 7.6  1.8 - 8.0 K/mcL Final   • Absolute Lymphocytes 04/08/2022 2.0  1.5 - 6.5 K/mcL Final   • Absolute Monocytes 04/08/2022 0.9 (A) 0.0 - 0.8 K/mcL Final   • Absolute Eosinophils  04/08/2022 0.4  0.0 - 0.5 K/mcL Final   • Absolute Basophils 04/08/2022 0.1  0.0 - 0.2 K/mcL Final   • Absolute Immmature Granulocytes 04/08/2022 0.1  0.0 - 0.2 K/mcL Final   • Rapid SARS-COV-2 by PCR 04/08/2022 Not Detected  Not Detected / Detected / Presumptive Positive / Inhibitors present Final   • Isolation Guidelines 04/08/2022    Final    Do not use this test result as the sole decision-maker for discontinuation of isolation.   Clinical evaluation should be considered for other respiratory illness requiring transmission-based isolation.    -    No fever (<99.0 F/37.2 C) for at least 24 hours without the use of fever-reducing medications    AND  -    Respiratory symptoms have improved or resolved (e.g. cough, shortness of breath)     AND  -    COVID-19 negative test    See COVID-19 Deisolation Resource Guide   • Procedural Comment 04/08/2022    Final    SARS-CoV-2 nucleic acid has not been detected indicating the absence of COVID-19.       Testing was performed using the [employer] Xpert Xpress SARS-CoV-2 RT-PCR assay that has been given Emergency Use  Authorization (EUA) by the [place] Food and Drug Administration (FDA).  These results are considered definitive and do not need to be confirmed by another method.         IMAGING STUDIES:    No orders to display        Assessment and Plan:  Principal Problem:    HUS (hemolytic uremic syndrome), atypical (CMS/HCC)    ASSESSMENT:     Bao Eric is a 15 year old male  with PMH of chronic kidney disease secondary to chronic thrombotic micrangiopathy due to a history of atypical HUS who is a direct admit for Eculizumab infusion. Will monitor vitals closely during and post infusion with plans to discharge following monitoring period.       Plan:    Neuro:  -Pretreat with:    -tylenol 650mg     -bynedral 50mg once     RESP:   - Stable on RA    CV:   - Continue to monitor  - montior vitals per infusion guidelines     GI/ Nutrition:   - Pediatric diet      Renal:   -eculizumab 900mg x 1   -Pediatric Nephrology on consult  -CBC, CMP, LDH, Mag, Phos  now   -rescue medications ordered PRN for anaphylaxis per infusion guidelines   -first AM urine sent for UA, urine protein/cr ratio, microalbumin       ID:  -Covid swab pending     Lines: PIV. Functional. Utilized for medication. Continues to be medically necessary.  VTE: 0 . Prevention Strategy: encourage maximal mobility.       Dispo: pending completion of infusion     Michelle Lewis DO   4/15/2022

## 2022-06-14 ENCOUNTER — TELEPHONE (OUTPATIENT)
Dept: NEUROLOGY | Facility: CLINIC | Age: 27
End: 2022-06-14
Payer: COMMERCIAL

## 2022-06-14 NOTE — TELEPHONE ENCOUNTER
Prior Authorization Retail Medication Request    Medication/Dose: Lamictal 200 MG Tablet  ICD code (if different than what is on RX):    Previously Tried and Failed:  See Chart  Rationale:  See Chart    Insurance Name:    Insurance ID:        Pharmacy Information (if different than what is on RX)  Name:    Phone:

## 2022-06-15 NOTE — TELEPHONE ENCOUNTER
Central Prior Authorization Team   Phone: [phone]      PA Initiation    Medication: Lamictal 200 MG Tablet  Insurance Company: [employer] - Phone [phone] Fax [phone]  [id number] [place] [place] - [address] [place]  Filling Pharmacy Phone: [phone]  Filling Pharmacy Fax:    Start Date: 6/15/2022

## 2022-06-16 NOTE — TELEPHONE ENCOUNTER
Prior Authorization Approval    Authorization Effective Date: 5/16/2022  Authorization Expiration Date: 6/16/2023  Medication: Lamictal 200 MG Tablet-APPROVED  Approved Dose/Quantity:   Reference #:     Insurance Company: [employer] - Phone [phone] Fax [phone]  Expected CoPay:       CoPay Card Available:      Foundation Assistance Needed:    Which Pharmacy is filling the prescription (Not needed for infusion/clinic administered): [id number] [place] - [address] [facility]  Pharmacy Notified: Yes  Patient Notified: No    06

## 2022-06-17 ENCOUNTER — TELEPHONE (OUTPATIENT)
Dept: NEUROLOGY | Facility: CLINIC | Age: 27
End: 2022-06-17
Payer: COMMERCIAL

## 2022-06-17 NOTE — TELEPHONE ENCOUNTER
Once form is completed, please call patient's mom, Nirali at [phone] to . Patient is attempting to get 's license for the first time.

## 2022-06-17 NOTE — TELEPHONE ENCOUNTER
Received DMV form to be completed.  Form saved to the [address].  Encounter routed.  Aleyda Harvey CMA

## 2022-07-11 DIAGNOSIS — G40.319 GENERALIZED CONVULSIVE EPILEPSY WITH INTRACTABLE EPILEPSY (H): ICD-10-CM

## 2022-07-13 RX ORDER — PERAMPANEL 4 MG/1
TABLET ORAL
Qty: 60 TABLET | Refills: 5 | Status: SHIPPED | OUTPATIENT
Start: 2022-07-13 | End: 2022-11-11

## 2022-11-11 ENCOUNTER — OFFICE VISIT (OUTPATIENT)
Dept: NEUROLOGY | Facility: CLINIC | Age: 27
End: 2022-11-11
Payer: COMMERCIAL

## 2022-11-11 VITALS
TEMPERATURE: 97.9 F | HEIGHT: 73 IN | WEIGHT: 179 LBS | HEART RATE: 73 BPM | DIASTOLIC BLOOD PRESSURE: 82 MMHG | BODY MASS INDEX: 23.72 KG/M2 | SYSTOLIC BLOOD PRESSURE: 133 MMHG

## 2022-11-11 DIAGNOSIS — G40.319 GENERALIZED CONVULSIVE EPILEPSY WITH INTRACTABLE EPILEPSY (H): Primary | ICD-10-CM

## 2022-11-11 RX ORDER — LAMOTRIGINE 200 MG/1
TABLET ORAL
Qty: 360 TABLET | Refills: 3 | Status: SHIPPED | OUTPATIENT
Start: 2022-11-11 | End: 2024-03-08

## 2022-11-11 NOTE — LETTER
2022       RE: Zach Gurrola  : 1995   MRN: [id number]      Dear Colleague,    Thank you for referring your patient, Zach Gurrola, to the [facility] at [facility]. Please see a copy of my visit note below.    Zach is a 27 year old who is being evaluated in clinic with mom.    Interval HX: Continues to be sz darren of major Sz but 1-2 minor Sz since last visit. One occurred at work . No major  daytime sz since 2015.   However, lost his job.  This  According to mom was mostly due to inability to complete work due to wrist pain. He had a wris darren=acture that was extensive and used many pins. Zach j=has x-ray on cell phone. Mom is applying for disability on basis of epilepsy, poor cognition and inability to use hand.    Prior to Admission medications    Medication Sig Start Date End Date Taking? 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  He has allergies to Dilantin and Depakote and he has failed generic Lamictal.  Neuropsychological testing 2010 indicated full scale IQ of 66, but this may have been somewhat influenced by his lack of cooperation.  Nevertheless, the impression was that the test results revealed considerable evidence of generalized cerebral dysfunction, moderate in overall degree and reflected in widespread intellectual limitations and cognitive deficits and scattered diffuse features.He also has ADHD.      His last EEG was in 2016 and was interpreted as moderate disturbance of background activity with frequent electrographic interictal and ictal activity which was generalized. Not  "significantly changed from previous in 2012   He had a presurgical evaluation at [place] in 2007 and also had a [facility] evaluation in 2004.  MRI scan at [facility] 04/2011 was essentially normal, with no evidence of mesial temporal sclerosis.    ROS: has small lesions on arm and back. Bone in left wrist \"no blood supply\" Otherwise negative for remainder of 14 point ros   EXAMINATION: Now tall, thin. He was more verbal and interacted well and cooperated with the examination than last time.  Speech was fluent.  Extraocular movements were full without nystagmus.  Pupils reacted briskly to light.  Facial movements were symmetrical.  Swallowing was normal.  Hearing was intact.  Visual fields were full to confrontation.  Finger-nose-finger was intact.  Tandem gait was normal. Muscle tone and strength and coordination in the upper and lower extremities were normal.      ASSESSMENT:  Major aytime Sz now under control.  Taper of ZNS copmpleted.  Was working but layed off. Living at home..On high dose of LAMO so overdose would be accomplished by extra 200. Completed driving learning. Needs form for diving completed. Meds discussed. Not vaccinated. covid risks disussed  Applying for social security   Plan:  1) Continue LAMO 200  1-1-2.= 800  2) Continue perampanel at 4 mg hs   3) RTC fater neuropsych  4) neuropsych testing      Panda Holden MD      "

## 2022-11-11 NOTE — PROGRESS NOTES
Zach is a 27 year old who is being evaluated in clinic with mom.    Interval HX: Continues to be sz darren of major Sz but 1-2 minor Sz since last visit. One occurred at work . No major  daytime sz since July 2015.   However, lost his job.  This  According to mom was mostly due to inability to complete work due to wrist pain. He had a wris darren=acture that was extensive and used many pins. Zach j=has x-ray on cell phone. Mom is applying for disability on basis of epilepsy, poor cognition and inability to use hand.    Prior to Admission medications    Medication Sig Start Date End Date Taking? 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  He has allergies to Dilantin and Depakote and he has failed generic Lamictal.  Neuropsychological testing 06/30/2010 indicated full scale IQ of 66, but this may have been somewhat influenced by his lack of cooperation.  Nevertheless, the impression was that the test results revealed considerable evidence of generalized cerebral dysfunction, moderate in overall degree and reflected in widespread intellectual limitations and cognitive deficits and scattered diffuse features.He also has ADHD.      His last EEG was in 2/22/2016 and was interpreted as moderate disturbance of background activity with frequent electrographic interictal and ictal activity which was generalized. Not significantly changed from previous in 2012   He had a presurgical evaluation at [place] in 2007 and also had a [facility] evaluation in 2004.  MRI scan at [facility] 04/2011 was essentially normal, with no evidence of mesial temporal sclerosis.    ROS: has small lesions on arm  "and back. Bone in left wrist \"no blood supply\" Otherwise negative for remainder of 14 point ros   EXAMINATION: Now tall, thin. He was more verbal and interacted well and cooperated with the examination than last time.  Speech was fluent.  Extraocular movements were full without nystagmus.  Pupils reacted briskly to light.  Facial movements were symmetrical.  Swallowing was normal.  Hearing was intact.  Visual fields were full to confrontation.  Finger-nose-finger was intact.  Tandem gait was normal. Muscle tone and strength and coordination in the upper and lower extremities were normal.      ASSESSMENT:  Major aytime Sz now under control.  Taper of ZNS copmpleted.  Was working but layed off. Living at home..On high dose of LAMO so overdose would be accomplished by extra 200. Completed driving learning. Needs form for diving completed. Meds discussed. Not vaccinated. covid risks disussed  Applying for social security   Plan:  1) Continue LAMO 200  1-1-2.= 800  2) Continue perampanel at 4 mg hs   3) RTC fater neuropsych  4) neuropsych testing           "

## 2023-02-16 DIAGNOSIS — G40.319 GENERALIZED CONVULSIVE EPILEPSY WITH INTRACTABLE EPILEPSY (H): ICD-10-CM

## 2023-02-20 RX ORDER — LAMOTRIGINE 200 MG/1
TABLET ORAL
Qty: 360 TABLET | Refills: 3 | OUTPATIENT
Start: 2023-02-20

## 2023-02-28 ENCOUNTER — OFFICE VISIT (OUTPATIENT)
Dept: NEUROLOGY | Facility: CLINIC | Age: 28
End: 2023-02-28
Payer: COMMERCIAL

## 2023-02-28 DIAGNOSIS — F06.8 OTHER SPECIFIED MENTAL DISORDERS DUE TO KNOWN PHYSIOLOGICAL CONDITION: ICD-10-CM

## 2023-02-28 DIAGNOSIS — R41.844 FRONTAL LOBE AND EXECUTIVE FUNCTION DEFICIT: ICD-10-CM

## 2023-02-28 DIAGNOSIS — G40.319 INTRACTABLE GENERALIZED IDIOPATHIC EPILEPSY WITHOUT STATUS EPILEPTICUS (H): Primary | ICD-10-CM

## 2023-02-28 NOTE — PROGRESS NOTES
Patient was seen for neuropsychological evaluation at the request of Dr. Panda Holden, for the purposes of diagnostic clarification and treatment planning.  2 hrs 18 min of test administration and scoring were provided by this writer, Annette Acevedo.  Please see Dr. Lorenzo Brink's report for a full interpretation of the findings.
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
## 2023-03-01 NOTE — PROGRESS NOTES
Name: Zach Gurrola MRN: [id number]  : 1995 DAVILA: 2023  Staff: TEDDY Tech:  Age: 27  Sex: Male Hand: Right Educ: 12  Vision: 20/20 ?with correction / ?without correction    ORIENTATION     Time  -0     Place       Personal info         WAIS-IV     VCI: ~76    MYRIAM: ~81     FSIQ:   ~77     WMI: ~77    PSI: ~92       Raw SSa     Similarities  14 5     Information  7 6     Block Design  20 5     Matrix Reasoning 16 8     Digit Span  23 7 RDS= 7     Arithmetic  8 5     Symbol Search  30 9     Coding  60 8    EDUARDA-O    Raw    T %ile     Copy    22.0     ?1     Time to Copy          COWAT (FAS)   Raw: 22   T: 32    Animals   Raw:  19  T-score:  43    BOSTON NAMING TEST   Raw: 44   T: 29     COMPLEX IDEATIONAL MATERIAL   Raw:   T: 28    TRAILS  Raw  Err T    A 35  0 35   B 166  3 22    STROOP Raw T   Word 79 35   Color  58 35       Color/Word  17 22    PEDROZA FACIAL RECOGNITION   Raw: 43  Interp: WNL    GROOVED PEGBOARD    Raw  T Drops   RH  98  25 0     21 0    BDI-II  Raw:  3  Interpretation: Minimal    MARU  Raw:  1  Interpretation: Minimal    WMS-III  Raw SS / %ile     Faces I 37 10     Faces II 34 8     Spatial Span 17 10    HVLT-R From 1     Trial 1 2 3 Total      8 10 10  28  Raw T     Total Learning (1-3) 28 48     Delayed Recall  9 40     Percent Retention 90 45     Recognition Hits/FP 12/2 44    BVMT-R Form 1     Trial 1 2 3 Total      3 4 6  13  Raw T / %ile     Total Learning (1-3) 13 <20     Delayed Recall  5 <20     Percent Retention 83 6th-10th     Recognition Hits/FP 6/0 >16th      TOMM T1: 48  T2: NA T3: NA

## 2023-03-15 ENCOUNTER — TELEPHONE (OUTPATIENT)
Dept: NEUROLOGY | Facility: CLINIC | Age: 28
End: 2023-03-15

## 2023-03-15 NOTE — TELEPHONE ENCOUNTER
Prior Authorization Retail Medication Request    Medication/Dose: Fycompa 4 MG Tablet  ICD code (if different than what is on RX):    Previously Tried and Failed:  See Chart  Rationale:  See Chart    Insurance Name:    Insurance ID:        Pharmacy Information (if different than what is on RX)  Name:    Phone:

## 2023-03-19 NOTE — TELEPHONE ENCOUNTER
PA Initiation    Medication: perampanel (FYCOMPA) 4 MG tablet   Insurance Company: [employer] - Phone [phone] Fax [phone]  Pharmacy Filling the Rx: [id number] [address]  Filling Pharmacy Phone: [phone]  Filling Pharmacy Fax: [phone]  Start Date: 3/19/2023

## 2023-03-20 NOTE — TELEPHONE ENCOUNTER
Prior Authorization Approval    Authorization Effective Date: 2/17/2023  Authorization Expiration Date: 3/19/2024  Medication: perampanel (FYCOMPA) 4 MG tablet--APPROVED  Approved Dose/Quantity:   Reference #:     Insurance Company: [employer] - Phone [phone] Fax [phone]  Expected CoPay:       CoPay Card Available:      Foundation Assistance Needed:    Which Pharmacy is filling the prescription (Not needed for infusion/clinic administered): [id number] [place] [address]  Pharmacy Notified: Yes  Patient Notified: Yes **Instructed pharmacy to notify patient when script is ready to /ship.**

## 2023-03-31 ENCOUNTER — OFFICE VISIT (OUTPATIENT)
Dept: NEUROLOGY | Facility: CLINIC | Age: 28
End: 2023-03-31
Payer: COMMERCIAL

## 2023-03-31 VITALS
WEIGHT: 192.2 LBS | HEIGHT: 73 IN | HEART RATE: 94 BPM | TEMPERATURE: 97.3 F | SYSTOLIC BLOOD PRESSURE: 132 MMHG | DIASTOLIC BLOOD PRESSURE: 81 MMHG | BODY MASS INDEX: 25.47 KG/M2

## 2023-03-31 DIAGNOSIS — G40.319 GENERALIZED CONVULSIVE EPILEPSY WITH INTRACTABLE EPILEPSY (H): Primary | ICD-10-CM

## 2023-03-31 RX ORDER — LAMOTRIGINE 200 MG/1
TABLET ORAL
Qty: 94 TABLET | Refills: 11 | Status: SHIPPED | OUTPATIENT
Start: 2023-03-31 | End: 2023-07-06

## 2023-03-31 ASSESSMENT — PAIN SCALES - GENERAL: PAINLEVEL: NO PAIN (0)
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
## 2023-03-31 NOTE — NURSING NOTE
Meds taken:  Fycompa on 3/30/23 @ 8 pm,  Lamotrigine @ 6:00 am.  Lab draw 11:09 am.  Aleyda Harvey, CMA
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
## 2023-04-01 LAB
Lab: NORMAL
PERFORMING LABORATORY: NORMAL
SPECIMEN STATUS: NORMAL
TEST NAME: NORMAL

## 2023-04-02 LAB — LAMOTRIGINE SERPL-MCNC: 18.7 UG/ML

## 2023-04-05 LAB — MISCELLANEOUS TEST 1 (ARUP): NORMAL

## 2023-05-03 ENCOUNTER — TELEPHONE (OUTPATIENT)
Dept: NEUROLOGY | Facility: CLINIC | Age: 28
End: 2023-05-03

## 2023-05-03 NOTE — TELEPHONE ENCOUNTER
Received incomplete DMV form.  Called patient and left a voice mail message to complete the Date of last episode of lost consciousness and fax it back to us so we can complete the form, have Dr. Holden complete and sign & date it to send it to DMV.

## 2023-05-05 ENCOUNTER — TELEPHONE (OUTPATIENT)
Dept: NEUROLOGY | Facility: CLINIC | Age: 28
End: 2023-05-05

## 2023-05-05 NOTE — TELEPHONE ENCOUNTER
S/w Patients, mother on 5/3/23.  They will come in on 5/4/23 to update the last episode of lost consciousness.  Aleyda Harvey, CMA

## 2023-05-05 NOTE — TELEPHONE ENCOUNTER
Received DMV Form to be completed. Form saved to [employer], encounter routed.  Aleyda Harvey CMA

## 2023-05-09 NOTE — TELEPHONE ENCOUNTER
DMV form signed, faxed and mailed  to [employer] on 05/09/2023, sent to scanning, and copy mailed to patient.

## 2023-06-14 ENCOUNTER — TELEPHONE (OUTPATIENT)
Dept: NEUROLOGY | Facility: CLINIC | Age: 28
End: 2023-06-14

## 2023-06-14 DIAGNOSIS — G40.319 GENERALIZED CONVULSIVE EPILEPSY WITH INTRACTABLE EPILEPSY (H): ICD-10-CM

## 2023-06-14 NOTE — TELEPHONE ENCOUNTER
Prior Authorization Not Needed per Insurance    Medication: LAMICTAL 200 MG PO TABS  Insurance Company: [employer] - Phone [phone] Fax [phone]  Expected CoPay:      [id number] [place], [place] - [address]  Pharmacy Notified: Yes  Patient Notified: Yes **Instructed pharmacy to notify patient when script is ready to /ship.**    PER PHARMACY THEY NEED A NEW SCRIPT FOR YULY SO THEY CAN PROCESS IT FOR A YULY CODE 1.  CURRENTLY THEY TRIED TO PROCESS WITH A DIFFERENT YULY CODE AND IT WILL NOT LET IT GO THROUGH. SO THEY NEED A NEW SCRIPT FOR YULY BRAND NAME.     PLEASE NOTE ALSO THE SCRIPT QUANTITY IS WRONG.  PATIENT IS TAKING 4 TIMES A DAY. SCRIPT QUANTITY IS 94 TABLETS.  THIS MEANS PHARMACY IS PROCESSING ONLY A 23 DAY SUPPLY. PLEASE PROVIDE A SCRIPT WITH A QUANTITY  TABLETS PER 30 DAYS.

## 2023-06-15 NOTE — TELEPHONE ENCOUNTER
What is the concern that needs to be addressed by a nurse? RAFAEL Chavarria stated that a new prescription needs to be sent to the local pharmacy and to send presription with the code YULY Code 1. Patient mother stated patient is all out of his medication and need refill today . Mom is requesting a call back once prescription is filled.       May a detailed message be left on voicemail? Yes         Message routed to:casey hallman

## 2023-07-06 RX ORDER — LAMOTRIGINE 200 MG/1
TABLET ORAL
Qty: 120 TABLET | Refills: 10 | Status: SHIPPED | OUTPATIENT
Start: 2023-07-06 | End: 2023-07-06

## 2023-07-06 RX ORDER — LAMOTRIGINE 200 MG/1
TABLET ORAL
Qty: 120 TABLET | Refills: 10 | Status: SHIPPED | OUTPATIENT
Start: 2023-07-06 | End: 2024-08-23

## 2023-07-06 NOTE — TELEPHONE ENCOUNTER
Multiple encounters  Prescription needs DAW1 and a correction to the quantity  Prescription placed per protocol    Resent due to failed transmission, receipt confirmed by pharmacy 7/6/2023 5:28 PM CDT

## 2023-08-09 DIAGNOSIS — G40.319 GENERALIZED CONVULSIVE EPILEPSY WITH INTRACTABLE EPILEPSY (H): ICD-10-CM

## 2023-08-11 ENCOUNTER — TELEPHONE (OUTPATIENT)
Dept: NEUROLOGY | Facility: CLINIC | Age: 28
End: 2023-08-11

## 2023-08-11 RX ORDER — OXCARBAZEPINE 60 MG/ML
SUSPENSION ORAL
Refills: 0 | OUTPATIENT
Start: 2023-08-11

## 2023-08-11 NOTE — TELEPHONE ENCOUNTER
Chart reviewed.  Request is for oxcarbazepine liquid, patient does not take OXC.    Call placed to pharmacy.  They have no record of patient needed OXC.  Patient needs a quantity override for VOSS tabs, as he takes four tabs per day, and the limit is 3.5 tabs per day.  Will initiate a PA request for quantity override.    Note, patient last level was 18.7 on 3/31/2023  Patient is currently daytime seizure free, and doing well with no reported side effects

## 2023-08-11 NOTE — TELEPHONE ENCOUNTER
Prior Authorization Retail Medication Request    Medication/Dose: Lamictal 200 mg tablets - Quantity exception  ICD code (if different than what is on RX):    Previously Tried and Failed:    Rationale:    Requires higher dose  Patient last level was 18.7 on 3/31/2023  Patient is currently daytime seizure free, and doing well with no reported side effects from medications    Insurance Name:    Insurance ID:        Pharmacy Information (if different than what is on RX)  Name:    Phone:

## 2023-08-16 NOTE — TELEPHONE ENCOUNTER
Prior Authorization Not Needed per Insurance    Medication: Lamictal 200 mg tablets - Quantity exception  Insurance Company: [employer] - Phone [phone] Fax [phone]  Expected CoPay:      [id number] [place], [place] - [address]  Pharmacy Notified: Yes  Patient Notified: Yes    Previously approved through plan.  Test claim shows a DUR reject that pharmacy is able to override.   Pharmacist has to just verify drug dosing since its over 3.5 tablets daily.    Pharmacy stated this issues was resolved, patient picked up on 8/11 no further action needed.

## 2023-10-12 DIAGNOSIS — G40.319 GENERALIZED CONVULSIVE EPILEPSY WITH INTRACTABLE EPILEPSY (H): ICD-10-CM

## 2023-10-13 NOTE — TELEPHONE ENCOUNTER
FYCOMPA 4 MG TABLET   Last Written Prescription Date:  3/31/2023  Last Fill Quantity: 60,   # refills: 5  Last Office Visit :  3/31/2023  Future Office visit:  None    Routing refill request to provider for review/approval because:  Drug not on the G, P or [facility] refill protocol or controlled substance    Jennifer Pepper RN  Central Triage Red Flags/Med Refills

## 2023-10-13 NOTE — TELEPHONE ENCOUNTER
Zach Gurrola's mother, Nirali is requesting a call back. Nirali states that patient is down to 2 pills. Would like a call back from nursing staff by end of business day to discuss medication refill at [phone].

## 2023-12-08 DIAGNOSIS — G40.319 GENERALIZED CONVULSIVE EPILEPSY WITH INTRACTABLE EPILEPSY (H): ICD-10-CM

## 2023-12-08 NOTE — TELEPHONE ENCOUNTER
Reviewed last office note, was to continue current dosing Reviewed , which is appropriate. Refills provided until next visit in March.   Afia Landin PA-C

## 2024-01-08 ENCOUNTER — TELEPHONE (OUTPATIENT)
Dept: NEUROLOGY | Facility: CLINIC | Age: 29
End: 2024-01-08

## 2024-01-08 NOTE — TELEPHONE ENCOUNTER
Prior Authorization Retail Medication Request    Medication/Dose: Fycompa 4 MG Tablets  Diagnosis and ICD code (if different than what is on RX):    New/renewal/insurance change PA/secondary ins. PA:  Previously Tried and Failed:  See Chart  Rationale:  See Chart    Insurance   Primary:   Insurance ID:      Secondary (if applicable):  Insurance ID:      Pharmacy Information (if different than what is on RX)  Name:    Phone:    Fax:

## 2024-01-08 NOTE — TELEPHONE ENCOUNTER
Central Prior Authorization Team  Phone: [phone]    PA Initiation    Medication: FYCOMPA 4 MG PO TABS  Insurance Company: [employer] - Phone [phone] Fax [phone]  [id number] [place], [place] - [address] [facility]  Filling Pharmacy Phone: [phone]  Filling Pharmacy Fax:    Start Date: 1/8/2024

## 2024-01-08 NOTE — TELEPHONE ENCOUNTER
Prior Authorization Retail Medication Request    Medication/Dose: Lamictal 200 MG Tablets  Diagnosis and ICD code (if different than what is on RX):    New/renewal/insurance change PA/secondary ins. PA:  Previously Tried and Failed:  See Chart  Rationale:  See Chart    Insurance   Primary:   Insurance ID:      Secondary (if applicable):  Insurance ID:      Pharmacy Information (if different than what is on RX)  Name:    Phone:    Fax:

## 2024-01-08 NOTE — TELEPHONE ENCOUNTER
Central Prior Authorization Team  Phone: [phone]    PA Initiation    Medication: LAMICTAL 200 MG PO TABS  Insurance Company: [employer] - Phone [phone] Fax [phone]  [id number] [place], [place] - [address]  Filling Pharmacy Phone: [phone]  Filling Pharmacy Fax:    Start Date: 1/8/2024

## 2024-01-09 NOTE — TELEPHONE ENCOUNTER
Central Prior Authorization Team  Phone: [phone]    Prior Authorization Approval    Medication: LAMICTAL 200 MG PO TABS  Authorization Effective Date: 1/1/2024  Authorization Expiration Date: 1/9/2025  Approved Dose/Quantity:   Reference #:     Insurance Company: [employer] - Phone [phone] Fax [phone]  Expected CoPay: $    CoPay Card Available:      Financial Assistance Needed:   Which [id number] [place] - [address]  Pharmacy Notified: no- unable to get a hole of pharm after an hour  Patient Notified: called pt mom

## 2024-01-09 NOTE — TELEPHONE ENCOUNTER
Central Prior Authorization Team   Phone: [phone]           Clinic calling with patient's mom on the back-line.  Mom is looking for status update.  She would like a call back [phone].  I let her know that I would send a teams message and through Epic in regards to this.

## 2024-01-09 NOTE — TELEPHONE ENCOUNTER
Central Prior Authorization Team   Phone: [phone]        Clinic calling with patient's mom on the back-line.  Mom is looking for status update.  She would like a call back [phone].  I let her know that I would send a teams message and through Epic in regards to this.

## 2024-01-09 NOTE — TELEPHONE ENCOUNTER
Central Prior Authorization Team  Phone: [phone]    Called insurance and this is approved.     Called mom to inform of approval.     Prior Authorization Approval    Medication: FYCOMPA 4 MG PO TABS  Authorization Effective Date:    Authorization Expiration Date:    Approved Dose/Quantity:   Reference #: [id number]   Insurance Company: [employer] - Phone [phone] Fax [phone]  Expected CoPay: $    CoPay Card Available:      Financial Assistance Needed:   Which [id number] [place], [place] - [address]  Pharmacy Notified: no- unable to get a hole of pharmacy after an hour  Patient Notified: called pt mom

## 2024-03-05 DIAGNOSIS — G40.319 GENERALIZED CONVULSIVE EPILEPSY WITH INTRACTABLE EPILEPSY (H): ICD-10-CM

## 2024-03-06 RX ORDER — PERAMPANEL 4 MG/1
4 TABLET ORAL AT BEDTIME
Qty: 30 TABLET | Refills: 0 | Status: SHIPPED | OUTPATIENT
Start: 2024-03-06 | End: 2024-03-08

## 2024-03-06 NOTE — TELEPHONE ENCOUNTER
Reviewed last office note, was to continue current dosing Reviewed , which is appropriate. Refill provided.   Afia Landin PA-C

## 2024-03-06 NOTE — TELEPHONE ENCOUNTER
Patient's mother is calling stating that patient is out of medication. Asking that script be sent as soon as possible. Patient's mother is requesting a call back once this has been completed.

## 2024-03-08 ENCOUNTER — OFFICE VISIT (OUTPATIENT)
Dept: NEUROLOGY | Facility: CLINIC | Age: 29
End: 2024-03-08
Payer: COMMERCIAL

## 2024-03-08 VITALS — DIASTOLIC BLOOD PRESSURE: 75 MMHG | SYSTOLIC BLOOD PRESSURE: 125 MMHG | TEMPERATURE: 97.3 F

## 2024-03-08 DIAGNOSIS — G40.319 GENERALIZED CONVULSIVE EPILEPSY WITH INTRACTABLE EPILEPSY (H): ICD-10-CM

## 2024-03-08 RX ORDER — LAMOTRIGINE 200 MG/1
TABLET ORAL
Qty: 360 TABLET | Refills: 3 | Status: SHIPPED | OUTPATIENT
Start: 2024-03-08

## 2024-03-08 NOTE — PROGRESS NOTES
Zach is a 27 year old who is being evaluated in clinic with mom.    Interval HX: Continues to be sz darren of major Sz but 1-2 minor Sz/ mol during sleep since last visit.   . No major  daytime sz since July 2015 but 5-6 Focal aware since last visit.   However, lost his job.  This  According to mom was mostly due to inability to complete work due to wrist pain. He had a wrist fracture that was extensive and used many pins. Zach has x-ray on cell phone. Mom is applying for disability on basis of epilepsy, poor cognition and inability to use hand.    Prior to Admission medications    Medication Sig Start Date End Date Taking? 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  He has allergies to Dilantin and Depakote and he has failed generic Lamictal.  Neuropsychological testing 06/30/2010 indicated full scale IQ of 66, but this may have been somewhat influenced by his lack of cooperation.  Nevertheless, the impression was that the test results revealed considerable evidence of generalized cerebral dysfunction, moderate in overall degree and reflected in widespread intellectual limitations and cognitive deficits and scattered diffuse features.He also has ADHD.      His last EEG was in 2/22/2016 and was interpreted as moderate disturbance of background activity with frequent electrographic interictal and ictal activity which was generalized. Not significantly changed from previous in 2012   He had a presurgical evaluation at [place] in 2007 and also had a [facility] evaluation in 2004.  MRI scan at [facility] 04/2011 was essentially normal, with no evidence of mesial temporal sclerosis.     "ROS: has small lesions on arm and back. Bone in left wrist \"no blood supply\" Otherwise negative for remainder of 14 point ros   EXAMINATION: Now tall, thin. He was more verbal and interacted well and cooperated with the examination than last time.  Speech was fluent.  Extraocular movements were full without nystagmus.  Pupils reacted briskly to light.  Facial movements were symmetrical.  Swallowing was normal.  Hearing was intact.  Visual fields were full to confrontation.  Finger-nose-finger was intact.  Tandem gait was normal. Muscle tone and strength and coordination in the upper and lower extremities were normal.      ASSESSMENT:  Major daytime Sz now under control.  Taper of ZNS copmpleted.  Was working but layed off. Having pain in hands and says he cannot lift more than 5 pounds,  Living at home..On high dose of LAMO . Completed driving learning. Needs form for diving completed. Meds discussed. Most of visit spent reviewing neuropsych tests and concerns for physical disability that needs to be  addressed by  Other MD. Applying for social security. He has borderline intellectual functioning, epilepsy ans wrist injury which make it very difficult to find   Adequate employment  Plan:  1) Continue LAMO 200  1-1-2.= 800  2) Continue perampanel at 4 mg hs  3) RTC 9 mo     TIME 3 min pre visit reviewing npsych report from Dr Brink; 35 min face to face with mom & Zach reviewing neuropsych, epilepsy, insurance issues for future, etc; 3 min post ;         "

## 2024-03-08 NOTE — LETTER
3/8/2024       RE: Zach Gurrola  : 1995   MRN: [id number]        Dear Colleague,    Thank you for referring your patient, Zach Gurrola, to the [facility] EPILEPSY CARE at [facility]. Please see a copy of my visit note below.    Zach is a 27 year old who is being evaluated in clinic with mom.    Interval HX: Continues to be sz darren of major Sz but 1-2 minor Sz/ mol during sleep since last visit.   . No major  daytime sz since 2015 but 5-6 Focal aware since last visit.   However, lost his job.  This  According to mom was mostly due to inability to complete work due to wrist pain. He had a wrist fracture that was extensive and used many pins. Zach has x-ray on cell phone. Mom is applying for disability on basis of epilepsy, poor cognition and inability to use hand.    Prior to Admission medications    Medication Sig Start Date End Date Taking? 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  He has allergies to Dilantin and Depakote and he has failed generic Lamictal.  Neuropsychological testing 2010 indicated full scale IQ of 66, but this may have been somewhat influenced by his lack of cooperation.  Nevertheless, the impression was that the test results revealed considerable evidence of generalized cerebral dysfunction, moderate in overall degree and reflected in widespread intellectual limitations and cognitive deficits and scattered diffuse features.He also has ADHD.      His last EEG was in 2016 and was interpreted as moderate disturbance of background activity with frequent electrographic interictal and  "ictal activity which was generalized. Not significantly changed from previous in 2012   He had a presurgical evaluation at [place] in 2007 and also had a [facility] evaluation in 2004.  MRI scan at [facility] 04/2011 was essentially normal, with no evidence of mesial temporal sclerosis.    ROS: has small lesions on arm and back. Bone in left wrist \"no blood supply\" Otherwise negative for remainder of 14 point ros   EXAMINATION: Now tall, thin. He was more verbal and interacted well and cooperated with the examination than last time.  Speech was fluent.  Extraocular movements were full without nystagmus.  Pupils reacted briskly to light.  Facial movements were symmetrical.  Swallowing was normal.  Hearing was intact.  Visual fields were full to confrontation.  Finger-nose-finger was intact.  Tandem gait was normal. Muscle tone and strength and coordination in the upper and lower extremities were normal.      ASSESSMENT:  Major daytime Sz now under control.  Taper of ZNS copmpleted.  Was working but layed off. Having pain in hands and says he cannot lift more than 5 pounds,  Living at home..On high dose of LAMO . Completed driving learning. Needs form for diving completed. Meds discussed. Most of visit spent reviewing neuropsych tests and concerns for physical disability that needs to be  addressed by  Other MD. Applying for social security. He has borderline intellectual functioning, epilepsy ans wrist injury which make it very difficult to find   Adequate employment  Plan:  1) Continue LAMO 200  1-1-2.= 800  2) Continue perampanel at 4 mg hs  3) RTC 9 mo     TIME 3 min pre visit reviewing npsych report from Dr Brink; 35 min face to face with mom & Zach reviewing neuropsych, epilepsy, insurance issues for future, etc; 3 min post ;         Again, thank you for allowing me to participate in the care of your patient.      Sincerely,    Panda Holden MD      "

## 2024-08-23 DIAGNOSIS — G40.319 GENERALIZED CONVULSIVE EPILEPSY WITH INTRACTABLE EPILEPSY (H): ICD-10-CM

## 2024-08-27 ENCOUNTER — OFFICE VISIT (OUTPATIENT)
Dept: NEUROLOGY | Facility: CLINIC | Age: 29
End: 2024-08-27
Payer: COMMERCIAL

## 2024-08-27 VITALS
HEART RATE: 89 BPM | TEMPERATURE: 98.1 F | WEIGHT: 173 LBS | SYSTOLIC BLOOD PRESSURE: 126 MMHG | HEIGHT: 73 IN | BODY MASS INDEX: 22.93 KG/M2 | DIASTOLIC BLOOD PRESSURE: 77 MMHG

## 2024-08-27 DIAGNOSIS — G40.319 GENERALIZED CONVULSIVE EPILEPSY WITH INTRACTABLE EPILEPSY (H): Primary | ICD-10-CM

## 2024-08-27 RX ORDER — LAMOTRIGINE 200 MG/1
TABLET ORAL
Qty: 120 TABLET | Refills: 11 | Status: SHIPPED | OUTPATIENT
Start: 2024-08-27 | End: 2024-08-27

## 2024-08-27 RX ORDER — LAMOTRIGINE 200 MG/1
TABLET ORAL
Qty: 120 TABLET | Refills: 11 | Status: SHIPPED | OUTPATIENT
Start: 2024-08-27

## 2024-08-27 NOTE — PROGRESS NOTES
Zach is a 27 year old who is being evaluated in clinic with mom.    Interval HX: Continues to be sz darren of major Sz but 1-2 minor Sz/ mol during sleep since last visit.   . No major  daytime sz since July 2015 but 5-6 Focal aware since last visit.   However, lost his job.  This  According to mom was mostly due to inability to complete work due to wrist pain. He had a wrist fracture that was extensive and used many pins. Zach has x-ray on cell phone. Mom is applying for disability on basis of epilepsy, poor cognition and inability to use hand.    Prior to Admission medications    Medication Sig Start Date End Date Taking? 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  He has allergies to Dilantin and Depakote and he has failed generic Lamictal.  Neuropsychological testing 06/30/2010 indicated full scale IQ of 66, but this may have been somewhat influenced by his lack of cooperation.  Nevertheless, the impression was that the test results revealed considerable evidence of generalized cerebral dysfunction, moderate in overall degree and reflected in widespread intellectual limitations and cognitive deficits and scattered diffuse features.He also has ADHD.      His last EEG was in 2/22/2016 and was interpreted as moderate disturbance of background activity with frequent electrographic interictal and ictal activity which was generalized. Not significantly changed from previous in 2012   He had a presurgical evaluation at [place] in 2007 and also had a [facility] evaluation in 2004.  MRI scan at [facility] 04/2011 was essentially normal, with no evidence of mesial temporal sclerosis.     "ROS: has small lesions on arm and back. Bone in left wrist \"no blood supply\" Otherwise negative for remainder of 14 point ros   EXAMINATION: Now tall, thin. He was more verbal and interacted well and cooperated with the examination than last time.  Speech was fluent.  Extraocular movements were full without nystagmus.  Pupils reacted briskly to light.  Facial movements were symmetrical.  Swallowing was normal.  Hearing was intact.  Visual fields were full to confrontation.  Finger-nose-finger was intact.  Tandem gait was normal. Muscle tone and strength and coordination in the upper and lower extremities were normal.      ASSESSMENT:  Major daytime Sz now under control.  Taper of ZNS copmpleted.  Was working but layed off. Having pain in hands and says he cannot lift more than 5 pounds,  Living at home..On high dose of LAMO . Completed driving learning. Needs form for diving completed. Meds discussed. Most of visit spent reviewing neuropsych tests and concerns for physical disability that needs to be  addressed by  Other MD. Applying for social security. He has borderline intellectual functioning, epilepsy ans wrist injury which make it very difficult to find   Adequate employment  Plan:  1) Continue LAMO 200  1-1-2.= 800  2) Continue perampanel at 4 mg hs  3) RTC 9 mo     TIME 3 min pre visit reviewing npsych report from Dr Brink; 35 min face to face with mom & Zach reviewing neuropsych, epilepsy, insurance issues for future, etc; 3 min post ;         "

## 2024-08-27 NOTE — LETTER
2024       RE: Zach Gurrola  : 1995   MRN: [id number]      Dear Colleague,    Thank you for referring your patient, Zach Gurrola, to the [facility] EPILEPSY CARE at [facility]. Please see a copy of my visit note below.    Zach is a 27 year old who is being evaluated in clinic with mom.    Interval HX: Continues to be sz darren of major Sz but 1-2 minor Sz/ mol during sleep since last visit.   . No major  daytime sz since 2015 but 5-6 Focal aware since last visit.   However, lost his job.  This  According to mom was mostly due to inability to complete work due to wrist pain. He had a wrist fracture that was extensive and used many pins. Zach has x-ray on cell phone. Mom is applying for disability on basis of epilepsy, poor cognition and inability to use hand.    Prior to Admission medications    Medication Sig Start Date End Date Taking? 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  He has allergies to Dilantin and Depakote and he has failed generic Lamictal.  Neuropsychological testing 2010 indicated full scale IQ of 66, but this may have been somewhat influenced by his lack of cooperation.  Nevertheless, the impression was that the test results revealed considerable evidence of generalized cerebral dysfunction, moderate in overall degree and reflected in widespread intellectual limitations and cognitive deficits and scattered diffuse features.He also has ADHD.      His last EEG was in 2016 and was interpreted as moderate disturbance of background activity with frequent electrographic interictal and  "ictal activity which was generalized. Not significantly changed from previous in 2012   He had a presurgical evaluation at [place] in 2007 and also had a [facility] evaluation in 2004.  MRI scan at [facility] 04/2011 was essentially normal, with no evidence of mesial temporal sclerosis.    ROS: has small lesions on arm and back. Bone in left wrist \"no blood supply\" Otherwise negative for remainder of 14 point ros   EXAMINATION: Now tall, thin. He was more verbal and interacted well and cooperated with the examination than last time.  Speech was fluent.  Extraocular movements were full without nystagmus.  Pupils reacted briskly to light.  Facial movements were symmetrical.  Swallowing was normal.  Hearing was intact.  Visual fields were full to confrontation.  Finger-nose-finger was intact.  Tandem gait was normal. Muscle tone and strength and coordination in the upper and lower extremities were normal.      ASSESSMENT:  Major daytime Sz now under control.  Taper of ZNS copmpleted.  Was working but layed off. Having pain in hands and says he cannot lift more than 5 pounds,  Living at home..On high dose of LAMO . Completed driving learning. Needs form for diving completed. Meds discussed. Most of visit spent reviewing neuropsych tests and concerns for physical disability that needs to be  addressed by  Other MD. Applying for social security. He has borderline intellectual functioning, epilepsy ans wrist injury which make it very difficult to find   Adequate employment  Plan:  1) Continue LAMO 200  1-1-2.= 800  2) Continue perampanel at 4 mg hs  3) RTC 9 mo     TIME 3 min pre visit reviewing npsych report from Dr Brink; 35 min face to face with mom & Zach reviewing neuropsych, epilepsy, insurance issues for future, etc; 3 min post ;           Again, thank you for allowing me to participate in the care of your patient.      Sincerely,    Panda Holden MD    "

## 2024-11-25 DIAGNOSIS — G40.319 GENERALIZED CONVULSIVE EPILEPSY WITH INTRACTABLE EPILEPSY (H): ICD-10-CM

## 2024-11-25 NOTE — TELEPHONE ENCOUNTER
Writer received a phone call mom stating that she was trying to order medications on her [employer] stephani and wanted to make sure there should be refills online.     Writer located consent to communicate for mom in chart.  We reviewed that patient should have refills on both medications (Lamictal and Fycompa), we then also reviewed upcoming appointments on 12/20 for EEG and appointment with Dr. Holden.

## 2025-01-23 ENCOUNTER — TELEPHONE (OUTPATIENT)
Dept: NEUROLOGY | Facility: CLINIC | Age: 30
End: 2025-01-23

## 2025-01-23 NOTE — TELEPHONE ENCOUNTER
Prior Authorization Retail Medication Request    Medication/Dose: Lamictal 200MG Tab  Diagnosis and ICD code (if different than what is on RX):  Generalized convulsive epilepsy with intractable epilepsy   New/renewal/insurance change PA/secondary ins. PA:  Previously Tried and Failed:  See Chart   Rationale:  See Chart    Insurance   Primary: BLUE PLUS   Insurance ID:  [id number]     Secondary (if applicable):  Insurance ID:      Pharmacy Information (if different than what is on RX)  Name:  [facility]   Phone:  [phone]   Fax: [phone]    Clinic Information  Preferred routing pool for dept communication: [place] MED

## 2025-01-27 DIAGNOSIS — G40.319 GENERALIZED CONVULSIVE EPILEPSY WITH INTRACTABLE EPILEPSY (H): ICD-10-CM

## 2025-01-27 NOTE — TELEPHONE ENCOUNTER
Medication refill needed as well as prior authorization needing completion by Dr. Holden. Please reach out at [phone].   [id number] - [place], [place] - [address] AT [facility] OF [address]

## 2025-01-27 NOTE — TELEPHONE ENCOUNTER
PA Initiation    Medication:    Insurance Company: [facility] - Phone [phone] Fax [phone]  Pharmacy Filling the Rx: [id number]  [place], [place] - [facility] [address] AT [facility] OF  [address]  Filling Pharmacy Phone: [phone]  Filling Pharmacy Fax: [phone]  Start Date: 1/27/2025

## 2025-01-28 RX ORDER — LAMOTRIGINE 200 MG/1
TABLET ORAL
Qty: 120 TABLET | Refills: 11 | Status: SHIPPED | OUTPATIENT
Start: 2025-01-28

## 2025-01-28 NOTE — TELEPHONE ENCOUNTER
Prior Authorization Approval    Medication:  Lamictal 200MG Tab   Authorization Effective Date: 1/1/2025  Authorization Expiration Date: 1/27/2026  Approved Dose/Quantity:   Reference #: [id number]   Insurance Company: [employer] Plus [employer] - Phone [phone] Fax [phone]  Which Pharmacy is filling the prescription: [facility] #[id number] - [facility], [place] - [address] AT [facility] OF  [address]  Pharmacy Notified: YES  Patient Notified: INSTRUCTED PHARMACY TO NOTIFY PATIENT WHEN READY FOR

## 2025-02-23 RX ORDER — PERAMPANEL 4 MG/1
4 TABLET ORAL AT BEDTIME
Qty: 30 TABLET | OUTPATIENT
Start: 2025-02-23

## 2025-02-28 ENCOUNTER — TELEPHONE (OUTPATIENT)
Dept: NEUROLOGY | Facility: CLINIC | Age: 30
End: 2025-02-28

## 2025-02-28 NOTE — TELEPHONE ENCOUNTER
Which pharmacy does pt go to?    [facility] #[id number] - [place], [place] - [address] AT [facility] [address]     Which medication is pt calling about?  Fycompa    How much medication does pt have left?  <5 days left    Does pt have refills?  NO    Does pt need the refill within 24 hours?  YES    Is medication a controlled substance?  YES    Is pt scheduled for provider follow-up visit?  YES, Appt Date: 3-7-25

## 2025-03-03 NOTE — TELEPHONE ENCOUNTER
Refill already completed 2/28/2025  No further action needed       Disp Refills Start End YULY   perampanel (FYCOMPA) 4 MG tablet 30 tablet 5 2/28/2025 -- No   Sig - Route: TAKE 1 TABLET(4 MG) BY MOUTH AT BEDTIME - Oral   Sent to pharmacy as: Fycompa 4 MG Oral Tablet (perampanel)   Class: E-Prescribe   Order: [id number]   E-Prescribing Status: Receipt confirmed by pharmacy (2/28/2025  4:10 PM CST)

## 2025-08-19 DIAGNOSIS — G40.319 GENERALIZED CONVULSIVE EPILEPSY WITH INTRACTABLE EPILEPSY (H): ICD-10-CM

## 2025-08-20 RX ORDER — PERAMPANEL 4 MG/1
4 TABLET, FILM COATED ORAL AT BEDTIME
Qty: 30 TABLET | Refills: 5 | Status: SHIPPED | OUTPATIENT
Start: 2025-08-20